# Patient Record
Sex: FEMALE | Race: WHITE | NOT HISPANIC OR LATINO | Employment: OTHER | RURAL
[De-identification: names, ages, dates, MRNs, and addresses within clinical notes are randomized per-mention and may not be internally consistent; named-entity substitution may affect disease eponyms.]

---

## 2017-01-23 ENCOUNTER — TELEPHONE (OUTPATIENT)
Dept: FAMILY MEDICINE CLINIC | Facility: CLINIC | Age: 82
End: 2017-01-23

## 2017-01-23 NOTE — TELEPHONE ENCOUNTER
Pt informed Bystolic does not come generic. Would like something else in generic form if she can because Bystolic is so expensive. Shashank

## 2017-01-24 RX ORDER — CARVEDILOL 25 MG/1
25 TABLET ORAL 2 TIMES DAILY WITH MEALS
Qty: 60 TABLET | Refills: 5 | Status: SHIPPED | OUTPATIENT
Start: 2017-01-24 | End: 2017-03-22 | Stop reason: SDUPTHER

## 2017-02-03 RX ORDER — DILTIAZEM HYDROCHLORIDE 180 MG/1
TABLET, EXTENDED RELEASE ORAL
Qty: 30 TABLET | Refills: 0 | Status: SHIPPED | OUTPATIENT
Start: 2017-02-03 | End: 2017-03-06 | Stop reason: SDUPTHER

## 2017-03-06 RX ORDER — DILTIAZEM HYDROCHLORIDE 180 MG/1
TABLET, EXTENDED RELEASE ORAL
Qty: 30 TABLET | Refills: 0 | Status: SHIPPED | OUTPATIENT
Start: 2017-03-06 | End: 2017-03-22 | Stop reason: SDUPTHER

## 2017-03-06 RX ORDER — LOSARTAN POTASSIUM 100 MG/1
TABLET ORAL
Qty: 90 TABLET | Refills: 0 | Status: SHIPPED | OUTPATIENT
Start: 2017-03-06 | End: 2017-03-22 | Stop reason: SDUPTHER

## 2017-03-15 ENCOUNTER — OFFICE VISIT (OUTPATIENT)
Dept: CARDIOLOGY | Facility: CLINIC | Age: 82
End: 2017-03-15

## 2017-03-15 VITALS
BODY MASS INDEX: 37.82 KG/M2 | SYSTOLIC BLOOD PRESSURE: 130 MMHG | HEIGHT: 65 IN | HEART RATE: 106 BPM | WEIGHT: 227 LBS | DIASTOLIC BLOOD PRESSURE: 64 MMHG

## 2017-03-15 DIAGNOSIS — I48.19 PERSISTENT ATRIAL FIBRILLATION (HCC): ICD-10-CM

## 2017-03-15 DIAGNOSIS — I10 ESSENTIAL HYPERTENSION: ICD-10-CM

## 2017-03-15 DIAGNOSIS — E78.5 HYPERLIPIDEMIA, UNSPECIFIED HYPERLIPIDEMIA TYPE: Primary | ICD-10-CM

## 2017-03-15 PROCEDURE — 99213 OFFICE O/P EST LOW 20 MIN: CPT | Performed by: INTERNAL MEDICINE

## 2017-03-15 NOTE — PROGRESS NOTES
"Subjective    Devora Grant is a 82 y.o. female. Fu of afib    History of Present Illness     AFIB:  Has permanent afib that is well tolertated. No bleeding problems with Xarelto but \"the  adds scare me\". No palpitations or sob or edema    HTN:  Good control by all checks    HLD:  Tolerates her statin ok and gets good reports from pcp on levels.      The following portions of the patient's history were reviewed and updated as appropriate: allergies, current medications, past family history, past medical history, past social history, past surgical history and problem list.    Patient Active Problem List   Diagnosis   • Essential hypertension   • Hyperlipidemia   • Persistent atrial fibrillation   • Malignant neoplasm of upper-outer quadrant of left female breast   • Bilateral shoulder pain       Allergies   Allergen Reactions   • Penicillins Hives   • Sulfa Antibiotics Hives       Family History   Problem Relation Age of Onset   • No Known Problems Mother    • No Known Problems Father        Social History     Social History   • Marital status:      Spouse name: N/A   • Number of children: N/A   • Years of education: N/A     Occupational History   • retired      Social History Main Topics   • Smoking status: Never Smoker   • Smokeless tobacco: Not on file   • Alcohol use No   • Drug use: No   • Sexual activity: No     Other Topics Concern   • Not on file     Social History Narrative         Current Outpatient Prescriptions:   •  Calcium Citrate-Vitamin D (CALCIUM + D PO), Take 500 mg by mouth daily., Disp: , Rfl:   •  carvedilol (COREG) 25 MG tablet, Take 1 tablet by mouth 2 (Two) Times a Day With Meals., Disp: 60 tablet, Rfl: 5  •  losartan (COZAAR) 100 MG tablet, TAKE 1 TABLET BY MOUTH EVERY DAY, Disp: 90 tablet, Rfl: 0  •  LOTEMAX 0.5 % ophthalmic suspension, SHAKE WELL AND INT 1 GTT IN OS D, Disp: , Rfl: 3  •  lovastatin (MEVACOR) 10 MG tablet, TAKE 1 TABLET BY MOUTH DAILY WITH SUPPER, Disp: 90 " "tablet, Rfl: 0  •  MATZIM  MG 24 hr tablet, TAKE 1 TABLET BY MOUTH EVERY DAY, Disp: 30 tablet, Rfl: 0  •  XARELTO 20 MG tablet, TK 1 T PO D, Disp: , Rfl: 10    Past Surgical History   Procedure Laterality Date   • Breast surgery     • Hysterectomy     • Appendectomy     • Mastectomy       Left   • Colonoscopy  10/2015     Polyps, fair prep, recall 1 year   • Colonoscopy       10 yr ago, Dr. Valiente       Review of Systems   Respiratory: Negative for apnea, chest tightness and shortness of breath.    Cardiovascular: Negative for chest pain, palpitations and leg swelling.   Gastrointestinal: Negative for abdominal pain.   Genitourinary: Negative for dysuria.   Musculoskeletal: Negative for myalgias.   Neurological: Negative for weakness and light-headedness.   Psychiatric/Behavioral: Negative for sleep disturbance.       Visit Vitals   • /64 (BP Location: Right arm, Patient Position: Sitting)   • Pulse 106   • Ht 65\" (165.1 cm)   • Wt 227 lb (103 kg)   • BMI 37.77 kg/m2     Procedures    Objective   Physical Exam   Constitutional: She is oriented to person, place, and time.   obese   HENT:   Head: Normocephalic.   Eyes: Pupils are equal, round, and reactive to light.   Cardiovascular: Normal heart sounds.  An irregularly irregular rhythm present. Tachycardia present.  Exam reveals decreased pulses.    Pulmonary/Chest: Effort normal and breath sounds normal. No respiratory distress. She has no wheezes. She has no rales.   Musculoskeletal: She exhibits no edema or tenderness.   Neurological: She is alert and oriented to person, place, and time.   Skin: Skin is warm and dry.   Psychiatric: She has a normal mood and affect.       Assessment/Plan   Devora was seen today for atrial fibrillation, hypertension and hyperlipidemia.    Diagnoses and all orders for this visit:    Hyperlipidemia, unspecified hyperlipidemia type  Comments:  controlled    Persistent atrial fibrillation  Comments:  well " tolerated    Essential hypertension  Comments:  controlled                 Return in about 1 year (around 3/15/2018) for Next scheduled follow up.  No orders of the defined types were placed in this encounter.

## 2017-03-17 ENCOUNTER — LAB (OUTPATIENT)
Dept: FAMILY MEDICINE CLINIC | Facility: CLINIC | Age: 82
End: 2017-03-17

## 2017-03-17 DIAGNOSIS — I10 ESSENTIAL HYPERTENSION: Primary | ICD-10-CM

## 2017-03-17 DIAGNOSIS — E78.5 HYPERLIPIDEMIA, UNSPECIFIED HYPERLIPIDEMIA TYPE: ICD-10-CM

## 2017-03-17 LAB
ALBUMIN SERPL-MCNC: 3.7 G/DL (ref 3.5–5)
ALBUMIN/GLOB SERPL: 1.3 G/DL (ref 1.1–2.5)
ALP SERPL-CCNC: 72 U/L (ref 24–120)
ALT SERPL-CCNC: 32 U/L (ref 0–54)
AST SERPL-CCNC: 23 U/L (ref 7–45)
BASOPHILS # BLD AUTO: 0.04 10*3/MM3 (ref 0–0.2)
BASOPHILS NFR BLD AUTO: 0.8 % (ref 0–2)
BILIRUB SERPL-MCNC: 0.6 MG/DL (ref 0.1–1)
BUN SERPL-MCNC: 12 MG/DL (ref 5–21)
BUN/CREAT SERPL: 17.9 (ref 7–25)
CALCIUM SERPL-MCNC: 9.3 MG/DL (ref 8.4–10.4)
CHLORIDE SERPL-SCNC: 98 MMOL/L (ref 98–110)
CHOLEST SERPL-MCNC: 168 MG/DL (ref 130–200)
CO2 SERPL-SCNC: 34 MMOL/L (ref 24–31)
CREAT SERPL-MCNC: 0.67 MG/DL (ref 0.5–1.4)
EOSINOPHIL # BLD AUTO: 0.16 10*3/MM3 (ref 0–0.7)
EOSINOPHIL NFR BLD AUTO: 3.1 % (ref 0–4)
ERYTHROCYTE [DISTWIDTH] IN BLOOD BY AUTOMATED COUNT: 14.1 % (ref 12–15)
GLOBULIN SER CALC-MCNC: 2.9 GM/DL
GLUCOSE SERPL-MCNC: 95 MG/DL (ref 70–100)
HCT VFR BLD AUTO: 41.8 % (ref 37–47)
HDLC SERPL-MCNC: 64 MG/DL
HGB BLD-MCNC: 13.5 G/DL (ref 12–16)
IMM GRANULOCYTES # BLD: 0.01 10*3/MM3 (ref 0–0.03)
IMM GRANULOCYTES NFR BLD: 0.2 % (ref 0–5)
LDLC SERPL CALC-MCNC: 90 MG/DL (ref 0–99)
LYMPHOCYTES # BLD AUTO: 1.08 10*3/MM3 (ref 0.72–4.86)
LYMPHOCYTES NFR BLD AUTO: 20.8 % (ref 15–45)
MCH RBC QN AUTO: 29.5 PG (ref 28–32)
MCHC RBC AUTO-ENTMCNC: 32.3 G/DL (ref 33–36)
MCV RBC AUTO: 91.3 FL (ref 82–98)
MONOCYTES # BLD AUTO: 0.48 10*3/MM3 (ref 0.19–1.3)
MONOCYTES NFR BLD AUTO: 9.2 % (ref 4–12)
NEUTROPHILS # BLD AUTO: 3.43 10*3/MM3 (ref 1.87–8.4)
NEUTROPHILS NFR BLD AUTO: 65.9 % (ref 39–78)
PLATELET # BLD AUTO: 153 10*3/MM3 (ref 130–400)
POTASSIUM SERPL-SCNC: 4.4 MMOL/L (ref 3.5–5.3)
PROT SERPL-MCNC: 6.6 G/DL (ref 6.3–8.7)
RBC # BLD AUTO: 4.58 10*6/MM3 (ref 4.2–5.4)
SODIUM SERPL-SCNC: 139 MMOL/L (ref 135–145)
TRIGL SERPL-MCNC: 69 MG/DL (ref 0–149)
TSH SERPL DL<=0.005 MIU/L-ACNC: 1.14 MIU/ML (ref 0.47–4.68)
VLDLC SERPL CALC-MCNC: 13.8 MG/DL
WBC # BLD AUTO: 5.2 10*3/MM3 (ref 4.8–10.8)

## 2017-03-22 ENCOUNTER — OFFICE VISIT (OUTPATIENT)
Dept: FAMILY MEDICINE CLINIC | Facility: CLINIC | Age: 82
End: 2017-03-22

## 2017-03-22 VITALS
WEIGHT: 227 LBS | HEART RATE: 143 BPM | OXYGEN SATURATION: 96 % | HEIGHT: 65 IN | SYSTOLIC BLOOD PRESSURE: 140 MMHG | BODY MASS INDEX: 37.82 KG/M2 | DIASTOLIC BLOOD PRESSURE: 82 MMHG | RESPIRATION RATE: 16 BRPM

## 2017-03-22 DIAGNOSIS — E78.2 MIXED HYPERLIPIDEMIA: ICD-10-CM

## 2017-03-22 DIAGNOSIS — M25.511 BILATERAL SHOULDER PAIN, UNSPECIFIED CHRONICITY: ICD-10-CM

## 2017-03-22 DIAGNOSIS — I10 ESSENTIAL HYPERTENSION: Primary | ICD-10-CM

## 2017-03-22 DIAGNOSIS — M25.512 BILATERAL SHOULDER PAIN, UNSPECIFIED CHRONICITY: ICD-10-CM

## 2017-03-22 DIAGNOSIS — I48.19 PERSISTENT ATRIAL FIBRILLATION (HCC): ICD-10-CM

## 2017-03-22 PROCEDURE — 99214 OFFICE O/P EST MOD 30 MIN: CPT | Performed by: FAMILY MEDICINE

## 2017-03-22 RX ORDER — LOVASTATIN 10 MG/1
10 TABLET ORAL DAILY
Qty: 90 TABLET | Refills: 1 | Status: SHIPPED | OUTPATIENT
Start: 2017-03-22 | End: 2017-09-07 | Stop reason: SDUPTHER

## 2017-03-22 RX ORDER — LOSARTAN POTASSIUM 100 MG/1
100 TABLET ORAL DAILY
Qty: 90 TABLET | Refills: 1 | Status: SHIPPED | OUTPATIENT
Start: 2017-03-22 | End: 2017-12-07 | Stop reason: SDUPTHER

## 2017-03-22 RX ORDER — DILTIAZEM HYDROCHLORIDE EXTENDED-RELEASE TABLETS 180 MG/1
180 TABLET, EXTENDED RELEASE ORAL DAILY
Qty: 90 TABLET | Refills: 1 | Status: SHIPPED | OUTPATIENT
Start: 2017-03-22 | End: 2017-09-07 | Stop reason: SDUPTHER

## 2017-03-22 RX ORDER — CARVEDILOL 25 MG/1
25 TABLET ORAL DAILY
Qty: 90 TABLET | Refills: 1 | Status: SHIPPED | OUTPATIENT
Start: 2017-03-22 | End: 2017-09-14 | Stop reason: SDUPTHER

## 2017-03-22 RX ORDER — RIVAROXABAN 20 MG/1
20 TABLET, FILM COATED ORAL
Qty: 90 TABLET | Refills: 1 | Status: SHIPPED | OUTPATIENT
Start: 2017-03-22 | End: 2017-09-07 | Stop reason: SDUPTHER

## 2017-03-22 NOTE — PROGRESS NOTES
Subjective   Devora Grant is a 82 y.o. female.     No chief complaint on file.  CC:im here for check up    History of Present Illness     She nots having good bp control without chest pain or dyspnea...she recently saw dr lopez about afib..she is toelraign xarelto without bleeding...she contineus to have shoulder pain and followed by orthopedics..she is tolerating mevacor without myalgias or muscle weakness       Current Outpatient Prescriptions:   •  Calcium Citrate-Vitamin D (CALCIUM + D PO), Take 500 mg by mouth daily., Disp: , Rfl:   •  carvedilol (COREG) 25 MG tablet, Take 1 tablet by mouth Daily., Disp: 90 tablet, Rfl: 1  •  diltiazem LA (MATZIM LA) 180 MG 24 hr tablet, Take 1 tablet by mouth Daily., Disp: 90 tablet, Rfl: 1  •  losartan (COZAAR) 100 MG tablet, Take 1 tablet by mouth Daily., Disp: 90 tablet, Rfl: 1  •  LOTEMAX 0.5 % ophthalmic suspension, SHAKE WELL AND INT 1 GTT IN OS D, Disp: , Rfl: 3  •  lovastatin (MEVACOR) 10 MG tablet, Take 1 tablet by mouth Daily., Disp: 90 tablet, Rfl: 1  •  XARELTO 20 MG tablet, Take 1 tablet by mouth Daily With Dinner., Disp: 90 tablet, Rfl: 1  Allergies   Allergen Reactions   • Penicillins Hives   • Sulfa Antibiotics Hives       Past Medical History:   Diagnosis Date   • Atrial fibrillation    • Cancer    • Cholelithiasis    • Colon polyp    • Hyperlipidemia    • Hypertension      Past Surgical History:   Procedure Laterality Date   • APPENDECTOMY     • BREAST SURGERY     • COLONOSCOPY  10/2015    Polyps, fair prep, recall 1 year   • COLONOSCOPY      10 yr ago, Dr. Valiente   • HYSTERECTOMY     • MASTECTOMY      Left       Review of Systems   Constitutional: Negative.    HENT: Negative.    Eyes: Negative.    Respiratory: Negative.    Cardiovascular: Negative.    Gastrointestinal: Negative.    Endocrine: Negative.    Genitourinary: Negative.    Musculoskeletal: Positive for arthralgias.   Skin: Negative.    Allergic/Immunologic: Negative.    Neurological: Negative.   "  Hematological: Negative.    Psychiatric/Behavioral: Negative.        Objective  /82  Pulse (!) 143  Resp 16  Ht 65\" (165.1 cm)  Wt 227 lb (103 kg)  SpO2 96%  BMI 37.77 kg/m2  Physical Exam   Constitutional: She is oriented to person, place, and time. She appears well-developed and well-nourished.   HENT:   Head: Normocephalic and atraumatic.   Right Ear: External ear normal.   Left Ear: External ear normal.   Nose: Nose normal.   Mouth/Throat: Oropharynx is clear and moist.   Eyes: Conjunctivae and EOM are normal. Pupils are equal, round, and reactive to light.   Neck: Normal range of motion. Neck supple.   Cardiovascular: Normal rate, normal heart sounds and intact distal pulses.    Pulmonary/Chest: Effort normal and breath sounds normal.   Abdominal: Soft. Bowel sounds are normal.   Musculoskeletal: Normal range of motion.   Neurological: She is alert and oriented to person, place, and time. She has normal reflexes.   Skin: Skin is warm and dry.   Psychiatric: She has a normal mood and affect. Her behavior is normal. Judgment and thought content normal.   Nursing note and vitals reviewed.      Assessment/Plan   Diagnoses and all orders for this visit:    Essential hypertension  -     CBC w AUTO Differential  -     Comprehensive Metabolic Panel  -     Lipid Panel    Mixed hyperlipidemia    Persistent atrial fibrillation    Bilateral shoulder pain, unspecified chronicity                 Orders Placed This Encounter   Procedures   • Comprehensive Metabolic Panel   • Lipid Panel       Follow up: 6 month(s)  "

## 2017-03-23 RX ORDER — LOVASTATIN 10 MG/1
TABLET ORAL
Qty: 90 TABLET | Refills: 1 | Status: SHIPPED | OUTPATIENT
Start: 2017-03-23 | End: 2019-07-30 | Stop reason: SDUPTHER

## 2017-09-07 RX ORDER — RIVAROXABAN 20 MG/1
TABLET, FILM COATED ORAL
Qty: 90 TABLET | Refills: 0 | Status: SHIPPED | OUTPATIENT
Start: 2017-09-07 | End: 2018-03-19

## 2017-09-07 RX ORDER — LOVASTATIN 10 MG/1
TABLET ORAL
Qty: 90 TABLET | Refills: 0 | Status: SHIPPED | OUTPATIENT
Start: 2017-09-07 | End: 2017-09-18 | Stop reason: SDUPTHER

## 2017-09-07 RX ORDER — DILTIAZEM HYDROCHLORIDE 180 MG/1
TABLET, EXTENDED RELEASE ORAL
Qty: 90 TABLET | Refills: 0 | Status: SHIPPED | OUTPATIENT
Start: 2017-09-07 | End: 2018-03-19

## 2017-09-14 RX ORDER — CARVEDILOL 25 MG/1
TABLET ORAL
Qty: 90 TABLET | Refills: 0 | Status: SHIPPED | OUTPATIENT
Start: 2017-09-14 | End: 2019-03-19 | Stop reason: SDUPTHER

## 2017-09-18 ENCOUNTER — OFFICE VISIT (OUTPATIENT)
Dept: FAMILY MEDICINE CLINIC | Facility: CLINIC | Age: 82
End: 2017-09-18

## 2017-09-18 VITALS
HEIGHT: 65 IN | HEART RATE: 114 BPM | OXYGEN SATURATION: 94 % | DIASTOLIC BLOOD PRESSURE: 100 MMHG | RESPIRATION RATE: 18 BRPM | SYSTOLIC BLOOD PRESSURE: 160 MMHG | BODY MASS INDEX: 35.65 KG/M2 | WEIGHT: 214 LBS

## 2017-09-18 DIAGNOSIS — C50.412 MALIGNANT NEOPLASM OF UPPER-OUTER QUADRANT OF LEFT FEMALE BREAST (HCC): ICD-10-CM

## 2017-09-18 DIAGNOSIS — E78.2 MIXED HYPERLIPIDEMIA: ICD-10-CM

## 2017-09-18 DIAGNOSIS — I48.19 PERSISTENT ATRIAL FIBRILLATION (HCC): ICD-10-CM

## 2017-09-18 DIAGNOSIS — I10 ESSENTIAL HYPERTENSION: Primary | ICD-10-CM

## 2017-09-18 LAB
ALBUMIN SERPL-MCNC: 3.8 G/DL (ref 3.5–5)
ALBUMIN/GLOB SERPL: 1.2 G/DL (ref 1.1–2.5)
ALP SERPL-CCNC: 61 U/L (ref 24–120)
ALT SERPL-CCNC: 31 U/L (ref 0–54)
AST SERPL-CCNC: 29 U/L (ref 7–45)
BASOPHILS # BLD AUTO: 0.06 10*3/MM3 (ref 0–0.2)
BASOPHILS NFR BLD AUTO: 0.9 % (ref 0–2)
BILIRUB SERPL-MCNC: 1 MG/DL (ref 0.1–1)
BUN SERPL-MCNC: 17 MG/DL (ref 5–21)
BUN/CREAT SERPL: 18.7 (ref 7–25)
CALCIUM SERPL-MCNC: 8.9 MG/DL (ref 8.4–10.4)
CHLORIDE SERPL-SCNC: 89 MMOL/L (ref 98–110)
CHOLEST SERPL-MCNC: 154 MG/DL (ref 130–200)
CO2 SERPL-SCNC: >40 MMOL/L (ref 24–31)
CREAT SERPL-MCNC: 0.91 MG/DL (ref 0.5–1.4)
EOSINOPHIL # BLD AUTO: 0.21 10*3/MM3 (ref 0–0.7)
EOSINOPHIL NFR BLD AUTO: 3.1 % (ref 0–4)
ERYTHROCYTE [DISTWIDTH] IN BLOOD BY AUTOMATED COUNT: 14.8 % (ref 12–15)
GLOBULIN SER CALC-MCNC: 3.1 GM/DL
GLUCOSE SERPL-MCNC: 94 MG/DL (ref 70–100)
HCT VFR BLD AUTO: 34.8 % (ref 37–47)
HDLC SERPL-MCNC: 55 MG/DL
HGB BLD-MCNC: 11.1 G/DL (ref 12–16)
IMM GRANULOCYTES # BLD: 0.03 10*3/MM3 (ref 0–0.03)
IMM GRANULOCYTES NFR BLD: 0.4 % (ref 0–5)
LDLC SERPL CALC-MCNC: 82 MG/DL (ref 0–99)
LYMPHOCYTES # BLD AUTO: 1.01 10*3/MM3 (ref 0.72–4.86)
LYMPHOCYTES NFR BLD AUTO: 14.8 % (ref 15–45)
MCH RBC QN AUTO: 29.4 PG (ref 28–32)
MCHC RBC AUTO-ENTMCNC: 31.9 G/DL (ref 33–36)
MCV RBC AUTO: 92.1 FL (ref 82–98)
MONOCYTES # BLD AUTO: 0.79 10*3/MM3 (ref 0.19–1.3)
MONOCYTES NFR BLD AUTO: 11.6 % (ref 4–12)
NEUTROPHILS # BLD AUTO: 4.71 10*3/MM3 (ref 1.87–8.4)
NEUTROPHILS NFR BLD AUTO: 69.2 % (ref 39–78)
PLATELET # BLD AUTO: 185 10*3/MM3 (ref 130–400)
POTASSIUM SERPL-SCNC: 2.5 MMOL/L (ref 3.5–5.3)
PROT SERPL-MCNC: 6.9 G/DL (ref 6.3–8.7)
RBC # BLD AUTO: 3.78 10*6/MM3 (ref 4.2–5.4)
SODIUM SERPL-SCNC: 143 MMOL/L (ref 135–145)
TRIGL SERPL-MCNC: 84 MG/DL (ref 0–149)
VLDLC SERPL CALC-MCNC: 16.8 MG/DL
WBC # BLD AUTO: 6.81 10*3/MM3 (ref 4.8–10.8)

## 2017-09-18 PROCEDURE — 99213 OFFICE O/P EST LOW 20 MIN: CPT | Performed by: FAMILY MEDICINE

## 2017-09-18 NOTE — PROGRESS NOTES
Subjective   Devora Grant is a 83 y.o. female.     Chief Complaint   Patient presents with   • Follow-up     6 mo        History of Present Illness     we note her bp is up but she blamesit on note takking it in the am--she denies any palpitaitons--she is tolerating statin wituout myalgias--she is toleating xarelto wituot bleeding  She is no longer seeing oncologist    Current Outpatient Prescriptions:   •  Calcium Citrate-Vitamin D (CALCIUM + D PO), Take 500 mg by mouth daily., Disp: , Rfl:   •  carvedilol (COREG) 25 MG tablet, TAKE 1 TABLET BY MOUTH DAILY, Disp: 90 tablet, Rfl: 0  •  losartan (COZAAR) 100 MG tablet, Take 1 tablet by mouth Daily., Disp: 90 tablet, Rfl: 1  •  LOTEMAX 0.5 % ophthalmic suspension, SHAKE WELL AND INT 1 GTT IN OS D, Disp: , Rfl: 3  •  lovastatin (MEVACOR) 10 MG tablet, TAKE 1 TABLET BY MOUTH DAILY WITH SUPPER, Disp: 90 tablet, Rfl: 1  •  MATZIM  MG 24 hr tablet, TAKE 1 TABLET BY MOUTH DAILY, Disp: 90 tablet, Rfl: 0  •  XARELTO 20 MG tablet, TAKE 1 TABLET BY MOUTH DAILY WITH DINNER, Disp: 90 tablet, Rfl: 0  Allergies   Allergen Reactions   • Penicillins Hives   • Sulfa Antibiotics Hives       Past Medical History:   Diagnosis Date   • Atrial fibrillation    • Cancer    • Cholelithiasis    • Colon polyp    • Hyperlipidemia    • Hypertension      Past Surgical History:   Procedure Laterality Date   • APPENDECTOMY     • BREAST SURGERY     • COLONOSCOPY  10/2015    Polyps, fair prep, recall 1 year   • COLONOSCOPY      10 yr ago, Dr. Valiente   • HYSTERECTOMY     • MASTECTOMY      Left       Review of Systems   Constitutional: Negative.    HENT: Negative.    Eyes: Negative.    Respiratory: Negative.    Cardiovascular: Negative.    Gastrointestinal: Negative.    Endocrine: Negative.    Genitourinary: Negative.    Musculoskeletal: Negative.    Skin: Negative.    Allergic/Immunologic: Negative.    Neurological: Negative.    Hematological: Negative.    Psychiatric/Behavioral: Negative.   "      Objective  /100  Pulse 114  Resp 18  Ht 65\" (165.1 cm)  Wt 214 lb (97.1 kg)  SpO2 94%  BMI 35.61 kg/m2  Physical Exam   Constitutional: She is oriented to person, place, and time. She appears well-developed and well-nourished.   HENT:   Head: Normocephalic and atraumatic.   Right Ear: External ear normal.   Left Ear: External ear normal.   Nose: Nose normal.   Mouth/Throat: Oropharynx is clear and moist.   Eyes: Conjunctivae and EOM are normal. Pupils are equal, round, and reactive to light.   Neck: Normal range of motion. Neck supple.   Cardiovascular: Normal rate, normal heart sounds and intact distal pulses.    Pulmonary/Chest: Effort normal and breath sounds normal.   Abdominal: Soft. Bowel sounds are normal.   Musculoskeletal: Normal range of motion.   Neurological: She is alert and oriented to person, place, and time. She has normal reflexes.   Skin: Skin is warm and dry.   Psychiatric: She has a normal mood and affect. Her behavior is normal. Judgment and thought content normal.   Nursing note and vitals reviewed.      Assessment/Plan   Devora was seen today for follow-up.    Diagnoses and all orders for this visit:    Essential hypertension  -     CBC w AUTO Differential  -     Comprehensive Metabolic Panel    Mixed hyperlipidemia  -     Lipid Panel    Persistent atrial fibrillation    Malignant neoplasm of upper-outer quadrant of left female breast      She does not get mammograms --she declines this    She declines further colonoscopies       Orders Placed This Encounter   Procedures   • Comprehensive Metabolic Panel   • Lipid Panel   • CBC w AUTO Differential     Order Specific Question:   Manual Differential     Answer:   No       Follow up: 6 month(s)  "

## 2017-09-19 ENCOUNTER — LAB (OUTPATIENT)
Dept: FAMILY MEDICINE CLINIC | Facility: CLINIC | Age: 82
End: 2017-09-19

## 2017-09-19 DIAGNOSIS — E87.6 LOW SERUM POTASSIUM LEVEL: Primary | ICD-10-CM

## 2017-09-20 ENCOUNTER — TELEPHONE (OUTPATIENT)
Dept: FAMILY MEDICINE CLINIC | Facility: CLINIC | Age: 82
End: 2017-09-20

## 2017-09-20 DIAGNOSIS — E87.6 LOW SERUM POTASSIUM: Primary | ICD-10-CM

## 2017-09-20 LAB — POTASSIUM SERPL-SCNC: 2.3 MMOL/L (ref 3.5–5.3)

## 2017-09-20 RX ORDER — POTASSIUM CHLORIDE 20 MEQ/1
20 TABLET, EXTENDED RELEASE ORAL DAILY
Qty: 2 TABLET | Refills: 0 | Status: SHIPPED | OUTPATIENT
Start: 2017-09-20 | End: 2020-04-24

## 2017-09-20 RX ORDER — SPIRONOLACTONE 25 MG/1
25 TABLET ORAL DAILY
Qty: 30 TABLET | Refills: 5 | Status: SHIPPED | OUTPATIENT
Start: 2017-09-20 | End: 2017-10-06 | Stop reason: CLARIF

## 2017-09-20 NOTE — TELEPHONE ENCOUNTER
Ok rx all done pt is aware lab appt is made her bp today is 150/80 pulse 76 she will be in fri am for lab

## 2017-09-20 NOTE — TELEPHONE ENCOUNTER
tod--had sent these abnormal labs to [facility] but forgot she is not working today and it needs attention today--so--let bhanu know her potassium is very low and we need to get it up--so--start aldactone 25mg #30,5rf and start K-dur 20meq daily #2 --2 days only--then come in early and get potassium rechecked--I want to make sure its better before the weekend

## 2017-09-22 LAB — POTASSIUM SERPL-SCNC: 2.8 MMOL/L (ref 3.5–5.3)

## 2017-09-22 RX ORDER — POTASSIUM CHLORIDE 600 MG/1
8 TABLET, FILM COATED, EXTENDED RELEASE ORAL ONCE
Qty: 1 TABLET | Refills: 0 | Status: SHIPPED | OUTPATIENT
Start: 2017-09-22 | End: 2017-09-22

## 2017-09-25 PROCEDURE — 99307 SBSQ NF CARE SF MDM 10: CPT | Performed by: FAMILY MEDICINE

## 2017-09-26 PROCEDURE — 99307 SBSQ NF CARE SF MDM 10: CPT | Performed by: FAMILY MEDICINE

## 2017-09-27 ENCOUNTER — TELEPHONE (OUTPATIENT)
Dept: FAMILY MEDICINE CLINIC | Facility: CLINIC | Age: 82
End: 2017-09-27

## 2017-09-27 NOTE — TELEPHONE ENCOUNTER
SPOKE WITH TRACY @ [facility].  I HAD TALKED TO ANEL EARLIER.  NAKITA IS A CANDIDATE FOR SWING BED AND THAT IS WHAT SHE WOULD LIKE TO DO IF YOU ARE OK WITH THAT.  SERGIO

## 2017-09-27 NOTE — TELEPHONE ENCOUNTER
Physical Therapy called from [place].   Devora said you had mentioned her going to the nursing home for awhile.  She said she has bad memories of the nursing home due to Ernst & Carla both dying out there.  She wants you to know that she is really working hard to get her strength back.  She walked 130 ft yesterday, twice what she walked the day before.  PT wonders if they can prepare her to go to swing bed for a couple weeks and then maybe she can go home with home health & pt.  Just an FYI.

## 2017-09-27 NOTE — TELEPHONE ENCOUNTER
Call toby and find out if what they decided about devora?--is she a candidate for swing?--what does Devora want to do?

## 2017-09-27 NOTE — TELEPHONE ENCOUNTER
She is going to admit todaty she will send over papter to sign and fax back.do u still want her on trandate prn she has not had since yesterday [id number]

## 2017-09-28 ENCOUNTER — TELEPHONE (OUTPATIENT)
Dept: FAMILY MEDICINE CLINIC | Facility: CLINIC | Age: 82
End: 2017-09-28

## 2017-09-30 PROCEDURE — 99307 SBSQ NF CARE SF MDM 10: CPT | Performed by: FAMILY MEDICINE

## 2017-10-03 PROCEDURE — 99307 SBSQ NF CARE SF MDM 10: CPT | Performed by: FAMILY MEDICINE

## 2017-10-05 ENCOUNTER — TELEPHONE (OUTPATIENT)
Dept: FAMILY MEDICINE CLINIC | Facility: CLINIC | Age: 82
End: 2017-10-05

## 2017-10-05 NOTE — TELEPHONE ENCOUNTER
Michelle Aldana called and said that OT and PT had a meeting today w/ Devora and are ready to discharge her tomorrow as long as you are ready to discharge her.   She said that Devora will need home health w/ PT/OT, nursing service and a rolling walker ordered.

## 2017-10-06 ENCOUNTER — TELEPHONE (OUTPATIENT)
Dept: FAMILY MEDICINE CLINIC | Facility: CLINIC | Age: 82
End: 2017-10-06

## 2017-10-06 ENCOUNTER — OUTSIDE FACILITY SERVICE (OUTPATIENT)
Dept: FAMILY MEDICINE CLINIC | Facility: CLINIC | Age: 82
End: 2017-10-06

## 2017-10-06 RX ORDER — SPIRONOLACTONE 50 MG/1
50 TABLET, FILM COATED ORAL DAILY
Qty: 30 TABLET | Refills: 6 | Status: SHIPPED | OUTPATIENT
Start: 2017-10-06 | End: 2018-01-09 | Stop reason: SDUPTHER

## 2017-10-06 RX ORDER — HYDRALAZINE HYDROCHLORIDE 25 MG/1
25 TABLET, FILM COATED ORAL 3 TIMES DAILY
Qty: 90 TABLET | Refills: 5 | Status: SHIPPED | OUTPATIENT
Start: 2017-10-06 | End: 2019-03-19 | Stop reason: SDUPTHER

## 2017-10-06 RX ORDER — HYDRALAZINE HYDROCHLORIDE 25 MG/1
25 TABLET, FILM COATED ORAL 3 TIMES DAILY
Qty: 90 TABLET | Refills: 5 | Status: SHIPPED | OUTPATIENT
Start: 2017-10-06 | End: 2018-03-26 | Stop reason: SDUPTHER

## 2017-10-06 NOTE — TELEPHONE ENCOUNTER
Pt is being discharged from the hospital. Needs scripts for Aldactone 50mg 1 qd  and Hydralazine 25mg tid to her pharmacy [facility]. Has been done

## 2017-10-12 ENCOUNTER — OFFICE VISIT (OUTPATIENT)
Dept: FAMILY MEDICINE CLINIC | Facility: CLINIC | Age: 82
End: 2017-10-12

## 2017-10-12 VITALS
DIASTOLIC BLOOD PRESSURE: 80 MMHG | WEIGHT: 206 LBS | HEART RATE: 89 BPM | HEIGHT: 65 IN | RESPIRATION RATE: 16 BRPM | OXYGEN SATURATION: 94 % | SYSTOLIC BLOOD PRESSURE: 140 MMHG | BODY MASS INDEX: 34.32 KG/M2

## 2017-10-12 DIAGNOSIS — E87.6 HYPOKALEMIA: ICD-10-CM

## 2017-10-12 DIAGNOSIS — I10 ESSENTIAL HYPERTENSION: Primary | ICD-10-CM

## 2017-10-12 LAB
BASOPHILS # BLD AUTO: 0.07 10*3/MM3 (ref 0–0.2)
BASOPHILS NFR BLD AUTO: 1 % (ref 0–2)
BUN SERPL-MCNC: 22 MG/DL (ref 5–21)
BUN/CREAT SERPL: 18.2 (ref 7–25)
CALCIUM SERPL-MCNC: 10.3 MG/DL (ref 8.4–10.4)
CHLORIDE SERPL-SCNC: 97 MMOL/L (ref 98–110)
CO2 SERPL-SCNC: 30 MMOL/L (ref 24–31)
CREAT SERPL-MCNC: 1.21 MG/DL (ref 0.5–1.4)
EOSINOPHIL # BLD AUTO: 0.47 10*3/MM3 (ref 0–0.7)
EOSINOPHIL NFR BLD AUTO: 6.6 % (ref 0–4)
ERYTHROCYTE [DISTWIDTH] IN BLOOD BY AUTOMATED COUNT: 16.2 % (ref 12–15)
GLUCOSE SERPL-MCNC: 112 MG/DL (ref 70–100)
HCT VFR BLD AUTO: 36.3 % (ref 37–47)
HGB BLD-MCNC: 11.3 G/DL (ref 12–16)
IMM GRANULOCYTES # BLD: 0.02 10*3/MM3 (ref 0–0.03)
IMM GRANULOCYTES NFR BLD: 0.3 % (ref 0–5)
LYMPHOCYTES # BLD AUTO: 0.8 10*3/MM3 (ref 0.72–4.86)
LYMPHOCYTES NFR BLD AUTO: 11.3 % (ref 15–45)
MCH RBC QN AUTO: 29.9 PG (ref 28–32)
MCHC RBC AUTO-ENTMCNC: 31.1 G/DL (ref 33–36)
MCV RBC AUTO: 96 FL (ref 82–98)
MONOCYTES # BLD AUTO: 0.58 10*3/MM3 (ref 0.19–1.3)
MONOCYTES NFR BLD AUTO: 8.2 % (ref 4–12)
NEUTROPHILS # BLD AUTO: 5.14 10*3/MM3 (ref 1.87–8.4)
NEUTROPHILS NFR BLD AUTO: 72.6 % (ref 39–78)
PLATELET # BLD AUTO: 207 10*3/MM3 (ref 130–400)
POTASSIUM SERPL-SCNC: 4.4 MMOL/L (ref 3.5–5.3)
RBC # BLD AUTO: 3.78 10*6/MM3 (ref 4.2–5.4)
SODIUM SERPL-SCNC: 140 MMOL/L (ref 135–145)
WBC # BLD AUTO: 7.08 10*3/MM3 (ref 4.8–10.8)

## 2017-10-12 PROCEDURE — 99213 OFFICE O/P EST LOW 20 MIN: CPT | Performed by: FAMILY MEDICINE

## 2017-10-12 NOTE — PROGRESS NOTES
Subjective   Devora Grant is a 83 y.o. female.     Chief Complaint   Patient presents with   • Follow-up     hospital        History of Present Illness     recently hospitalzied for bp and hypokalemia--her muscle strength is better--sheis walking better      Current Outpatient Prescriptions:   •  Calcium Citrate-Vitamin D (CALCIUM + D PO), Take 500 mg by mouth daily., Disp: , Rfl:   •  carvedilol (COREG) 25 MG tablet, TAKE 1 TABLET BY MOUTH DAILY, Disp: 90 tablet, Rfl: 0  •  hydrALAZINE (APRESOLINE) 25 MG tablet, Take 1 tablet by mouth 3 (Three) Times a Day., Disp: 90 tablet, Rfl: 5  •  hydrALAZINE (APRESOLINE) 25 MG tablet, Take 1 tablet by mouth 3 (Three) Times a Day., Disp: 90 tablet, Rfl: 5  •  losartan (COZAAR) 100 MG tablet, Take 1 tablet by mouth Daily., Disp: 90 tablet, Rfl: 1  •  LOTEMAX 0.5 % ophthalmic suspension, SHAKE WELL AND INT 1 GTT IN OS D, Disp: , Rfl: 3  •  lovastatin (MEVACOR) 10 MG tablet, TAKE 1 TABLET BY MOUTH DAILY WITH SUPPER, Disp: 90 tablet, Rfl: 1  •  MATZIM  MG 24 hr tablet, TAKE 1 TABLET BY MOUTH DAILY, Disp: 90 tablet, Rfl: 0  •  potassium chloride (K-DUR,KLOR-CON) 20 MEQ CR tablet, Take 1 tablet by mouth Daily., Disp: 2 tablet, Rfl: 0  •  spironolactone (ALDACTONE) 50 MG tablet, Take 1 tablet by mouth Daily., Disp: 30 tablet, Rfl: 6  •  XARELTO 20 MG tablet, TAKE 1 TABLET BY MOUTH DAILY WITH DINNER, Disp: 90 tablet, Rfl: 0  Allergies   Allergen Reactions   • Penicillins Hives   • Sulfa Antibiotics Hives       Past Medical History:   Diagnosis Date   • Atrial fibrillation    • Cancer    • Cholelithiasis    • Colon polyp    • Hyperlipidemia    • Hypertension      Past Surgical History:   Procedure Laterality Date   • APPENDECTOMY     • BREAST SURGERY     • COLONOSCOPY  10/2015    Polyps, fair prep, recall 1 year   • COLONOSCOPY      10 yr ago, Dr. Valiente   • HYSTERECTOMY     • MASTECTOMY      Left       Review of Systems   Constitutional: Negative.    HENT: Negative.    Eyes:  "Negative.    Respiratory: Negative.    Cardiovascular: Negative.    Gastrointestinal: Negative.    Endocrine: Negative.    Genitourinary: Negative.    Musculoskeletal: Positive for myalgias.   Skin: Negative.    Allergic/Immunologic: Negative.    Neurological: Negative.    Hematological: Negative.    Psychiatric/Behavioral: Negative.        Objective  /80  Pulse 89  Resp 16  Ht 65\" (165.1 cm)  Wt 206 lb (93.4 kg)  SpO2 94%  BMI 34.28 kg/m2  Physical Exam   Constitutional: She is oriented to person, place, and time. She appears well-developed and well-nourished.   HENT:   Head: Normocephalic.   Eyes: EOM are normal. Pupils are equal, round, and reactive to light.   Neck: Normal range of motion.   Cardiovascular: Normal rate, regular rhythm, normal heart sounds and intact distal pulses.    Pulmonary/Chest: Effort normal and breath sounds normal.   Abdominal: Soft. Bowel sounds are normal.   Musculoskeletal: Normal range of motion.   Neurological: She is alert and oriented to person, place, and time. She has normal reflexes.   Skin: Skin is warm and dry.   Psychiatric: She has a normal mood and affect. Her behavior is normal. Judgment and thought content normal.   Nursing note and vitals reviewed.      Assessment/Plan   Devora was seen today for follow-up.    Diagnoses and all orders for this visit:    Essential hypertension  -     CBC w AUTO Differential  -     Basic metabolic panel    Hypokalemia  -     CBC w AUTO Differential  -     Basic metabolic panel                 Orders Placed This Encounter   Procedures   • Basic metabolic panel   • CBC w AUTO Differential     Order Specific Question:   Manual Differential     Answer:   No     Current outpatient and discharge medications have been reconciled for the patient.  Sandoval Christian MD  Follow up: 3 month(s)  "

## 2017-11-20 RX ORDER — DILTIAZEM HYDROCHLORIDE 180 MG/1
180 CAPSULE, EXTENDED RELEASE ORAL DAILY
Qty: 30 CAPSULE | Refills: 5 | OUTPATIENT
Start: 2017-11-20 | End: 2018-01-09 | Stop reason: SDUPTHER

## 2017-11-20 NOTE — TELEPHONE ENCOUNTER
Matzim LA is not covered by Patients insurance.  Dr Christian has changed med to Dilt- mg capsules.

## 2017-12-07 RX ORDER — LOSARTAN POTASSIUM 100 MG/1
TABLET ORAL
Qty: 90 TABLET | Refills: 0 | Status: SHIPPED | OUTPATIENT
Start: 2017-12-07 | End: 2018-02-27 | Stop reason: SDUPTHER

## 2017-12-27 RX ORDER — LOVASTATIN 10 MG/1
TABLET ORAL
Qty: 90 TABLET | Refills: 0 | Status: SHIPPED | OUTPATIENT
Start: 2017-12-27 | End: 2018-03-19 | Stop reason: SDUPTHER

## 2018-01-09 RX ORDER — DILTIAZEM HYDROCHLORIDE 180 MG/1
180 CAPSULE, EXTENDED RELEASE ORAL DAILY
Qty: 90 CAPSULE | Refills: 1 | OUTPATIENT
Start: 2018-01-09 | End: 2018-09-18 | Stop reason: SDUPTHER

## 2018-01-09 RX ORDER — SPIRONOLACTONE 50 MG/1
50 TABLET, FILM COATED ORAL DAILY
Qty: 90 TABLET | Refills: 1 | Status: SHIPPED | OUTPATIENT
Start: 2018-01-09 | End: 2018-08-06 | Stop reason: SDUPTHER

## 2018-02-27 RX ORDER — LOSARTAN POTASSIUM 100 MG/1
TABLET ORAL
Qty: 90 TABLET | Refills: 0 | Status: SHIPPED | OUTPATIENT
Start: 2018-02-27 | End: 2018-05-29 | Stop reason: SDUPTHER

## 2018-03-19 ENCOUNTER — OFFICE VISIT (OUTPATIENT)
Dept: FAMILY MEDICINE CLINIC | Facility: CLINIC | Age: 83
End: 2018-03-19

## 2018-03-19 VITALS
SYSTOLIC BLOOD PRESSURE: 128 MMHG | RESPIRATION RATE: 16 BRPM | BODY MASS INDEX: 32.65 KG/M2 | WEIGHT: 196 LBS | OXYGEN SATURATION: 96 % | DIASTOLIC BLOOD PRESSURE: 80 MMHG | HEART RATE: 85 BPM | HEIGHT: 65 IN

## 2018-03-19 DIAGNOSIS — E78.2 MIXED HYPERLIPIDEMIA: ICD-10-CM

## 2018-03-19 DIAGNOSIS — E87.6 HYPOKALEMIA: ICD-10-CM

## 2018-03-19 DIAGNOSIS — I48.19 PERSISTENT ATRIAL FIBRILLATION (HCC): Primary | ICD-10-CM

## 2018-03-19 DIAGNOSIS — I10 ESSENTIAL HYPERTENSION: ICD-10-CM

## 2018-03-19 PROCEDURE — 99213 OFFICE O/P EST LOW 20 MIN: CPT | Performed by: FAMILY MEDICINE

## 2018-03-19 NOTE — PROGRESS NOTES
Subjective   Devora Grant is a 83 y.o. female.     Chief Complaint   Patient presents with   • Follow-up     6 mo        History of Present Illness     she thinks she is doing well wtih atrial fib without cp or ha---she notes bp is stable wituout cp or ha-she is noting tolerating potassium nicely--she is tolerating mevacor without myalgias      Current Outpatient Prescriptions:   •  apixaban (ELIQUIS) 5 MG tablet tablet, Take 1 tablet by mouth Every 12 (Twelve) Hours., Disp: 180 tablet, Rfl: 1  •  Calcium Citrate-Vitamin D (CALCIUM + D PO), Take 500 mg by mouth daily., Disp: , Rfl:   •  carvedilol (COREG) 25 MG tablet, TAKE 1 TABLET BY MOUTH DAILY, Disp: 90 tablet, Rfl: 0  •  diltiazem XR (DILACOR XR) 180 MG 24 hr capsule, Take 1 capsule by mouth Daily., Disp: 90 capsule, Rfl: 1  •  hydrALAZINE (APRESOLINE) 25 MG tablet, Take 1 tablet by mouth 3 (Three) Times a Day., Disp: 90 tablet, Rfl: 5  •  hydrALAZINE (APRESOLINE) 25 MG tablet, Take 1 tablet by mouth 3 (Three) Times a Day., Disp: 90 tablet, Rfl: 5  •  losartan (COZAAR) 100 MG tablet, TAKE 1 TABLET BY MOUTH DAILY, Disp: 90 tablet, Rfl: 0  •  LOTEMAX 0.5 % ophthalmic suspension, SHAKE WELL AND INT 1 GTT IN OS D, Disp: , Rfl: 3  •  lovastatin (MEVACOR) 10 MG tablet, TAKE 1 TABLET BY MOUTH DAILY WITH SUPPER, Disp: 90 tablet, Rfl: 1  •  potassium chloride (K-DUR,KLOR-CON) 20 MEQ CR tablet, Take 1 tablet by mouth Daily., Disp: 2 tablet, Rfl: 0  •  spironolactone (ALDACTONE) 50 MG tablet, Take 1 tablet by mouth Daily., Disp: 90 tablet, Rfl: 1  Allergies   Allergen Reactions   • Penicillins Hives   • Sulfa Antibiotics Hives       Past Medical History:   Diagnosis Date   • Atrial fibrillation    • Cancer    • Cholelithiasis    • Colon polyp    • Hyperlipidemia    • Hypertension      Past Surgical History:   Procedure Laterality Date   • APPENDECTOMY     • BREAST SURGERY     • COLONOSCOPY  10/2015    Polyps, fair prep, recall 1 year   • COLONOSCOPY      10 yr ago,   "Steffanie   • HYSTERECTOMY     • MASTECTOMY      Left       Review of Systems   Constitutional: Negative.    HENT: Negative.    Eyes: Negative.    Respiratory: Negative.    Cardiovascular: Negative.    Gastrointestinal: Negative.    Endocrine: Negative.    Genitourinary: Negative.    Musculoskeletal: Negative.    Skin: Negative.    Allergic/Immunologic: Negative.    Neurological: Negative.    Hematological: Negative.    Psychiatric/Behavioral: Negative.        Objective  /80   Pulse 85   Resp 16   Ht 165.1 cm (65\")   Wt 88.9 kg (196 lb)   SpO2 96%   BMI 32.62 kg/m²   Physical Exam   Constitutional: She is oriented to person, place, and time. She appears well-developed and well-nourished.   HENT:   Head: Normocephalic and atraumatic.   Right Ear: External ear normal.   Left Ear: External ear normal.   Nose: Nose normal.   Mouth/Throat: Oropharynx is clear and moist.   Eyes: Conjunctivae and EOM are normal. Pupils are equal, round, and reactive to light.   Neck: Normal range of motion. Neck supple.   Cardiovascular: Normal rate, regular rhythm, normal heart sounds and intact distal pulses.    Pulmonary/Chest: Effort normal and breath sounds normal.   Abdominal: Soft. Bowel sounds are normal.   Musculoskeletal: Normal range of motion.   Neurological: She is alert and oriented to person, place, and time. She has normal reflexes.   Skin: Skin is warm and dry. Capillary refill takes less than 2 seconds.   Psychiatric: She has a normal mood and affect. Her behavior is normal. Judgment and thought content normal.   Nursing note and vitals reviewed.      Assessment/Plan   Devora was seen today for follow-up.    Diagnoses and all orders for this visit:    Persistent atrial fibrillation    Mixed hyperlipidemia    Essential hypertension    Hypokalemia    we reviwed her labs  She declines further mammograms and colon ca screening    Discussed the patient's BMI with her. BMI is above normal parameters. Follow-up plan " includes:  no follow-up required.         No orders of the defined types were placed in this encounter.      Follow up: 6 month(s)

## 2018-03-27 RX ORDER — LOVASTATIN 10 MG/1
TABLET ORAL
Qty: 90 TABLET | Refills: 5 | Status: SHIPPED | OUTPATIENT
Start: 2018-03-27 | End: 2018-09-18 | Stop reason: SDUPTHER

## 2018-03-27 RX ORDER — HYDRALAZINE HYDROCHLORIDE 25 MG/1
TABLET, FILM COATED ORAL
Qty: 90 TABLET | Refills: 5 | Status: SHIPPED | OUTPATIENT
Start: 2018-03-27 | End: 2018-09-18 | Stop reason: SDUPTHER

## 2018-03-27 RX ORDER — CARVEDILOL 25 MG/1
TABLET ORAL
Qty: 60 TABLET | Refills: 5 | Status: SHIPPED | OUTPATIENT
Start: 2018-03-27 | End: 2018-09-18 | Stop reason: DRUGHIGH

## 2018-05-29 RX ORDER — LOSARTAN POTASSIUM 100 MG/1
TABLET ORAL
Qty: 90 TABLET | Refills: 1 | Status: SHIPPED | OUTPATIENT
Start: 2018-05-29 | End: 2018-11-27 | Stop reason: SDUPTHER

## 2018-07-03 RX ORDER — DILTIAZEM HYDROCHLORIDE 180 MG/1
CAPSULE, EXTENDED RELEASE ORAL
Qty: 90 CAPSULE | Refills: 0 | Status: SHIPPED | OUTPATIENT
Start: 2018-07-03 | End: 2018-09-27 | Stop reason: SDUPTHER

## 2018-07-19 RX ORDER — APIXABAN 5 MG/1
TABLET, FILM COATED ORAL
Qty: 180 TABLET | Refills: 1 | Status: SHIPPED | OUTPATIENT
Start: 2018-07-19 | End: 2018-12-28 | Stop reason: SDUPTHER

## 2018-08-06 RX ORDER — SPIRONOLACTONE 50 MG/1
50 TABLET, FILM COATED ORAL DAILY
Qty: 90 TABLET | Refills: 3 | Status: SHIPPED | OUTPATIENT
Start: 2018-08-06 | End: 2018-11-14 | Stop reason: SDUPTHER

## 2018-09-13 DIAGNOSIS — I10 ESSENTIAL HYPERTENSION: ICD-10-CM

## 2018-09-13 DIAGNOSIS — E78.5 HYPERLIPIDEMIA, UNSPECIFIED HYPERLIPIDEMIA TYPE: Primary | ICD-10-CM

## 2018-09-13 DIAGNOSIS — E78.5 HYPERLIPIDEMIA, UNSPECIFIED HYPERLIPIDEMIA TYPE: ICD-10-CM

## 2018-09-14 LAB
ALBUMIN SERPL-MCNC: 3.7 G/DL (ref 3.5–5)
ALBUMIN/GLOB SERPL: 1.3 G/DL (ref 1.1–2.5)
ALP SERPL-CCNC: 53 U/L (ref 24–120)
ALT SERPL-CCNC: 29 U/L (ref 0–54)
AST SERPL-CCNC: 24 U/L (ref 7–45)
BASOPHILS # BLD AUTO: 0.06 10*3/MM3 (ref 0–0.2)
BASOPHILS NFR BLD AUTO: 1.2 % (ref 0–2)
BILIRUB SERPL-MCNC: 0.5 MG/DL (ref 0.1–1)
BUN SERPL-MCNC: 20 MG/DL (ref 5–21)
BUN/CREAT SERPL: 24.4 (ref 7–25)
CALCIUM SERPL-MCNC: 9.7 MG/DL (ref 8.4–10.4)
CHLORIDE SERPL-SCNC: 98 MMOL/L (ref 98–110)
CHOLEST SERPL-MCNC: 160 MG/DL (ref 130–200)
CO2 SERPL-SCNC: 31 MMOL/L (ref 24–31)
CREAT SERPL-MCNC: 0.82 MG/DL (ref 0.5–1.4)
EOSINOPHIL # BLD AUTO: 0.21 10*3/MM3 (ref 0–0.7)
EOSINOPHIL NFR BLD AUTO: 4.3 % (ref 0–4)
ERYTHROCYTE [DISTWIDTH] IN BLOOD BY AUTOMATED COUNT: 13.4 % (ref 12–15)
GLOBULIN SER CALC-MCNC: 2.9 GM/DL
GLUCOSE SERPL-MCNC: 91 MG/DL (ref 70–100)
HCT VFR BLD AUTO: 34.2 % (ref 37–47)
HDLC SERPL-MCNC: 54 MG/DL
HGB BLD-MCNC: 11 G/DL (ref 12–16)
IMM GRANULOCYTES # BLD: 0.01 10*3/MM3 (ref 0–0.03)
IMM GRANULOCYTES NFR BLD: 0.2 % (ref 0–5)
LDLC SERPL CALC-MCNC: 92 MG/DL (ref 0–99)
LYMPHOCYTES # BLD AUTO: 0.99 10*3/MM3 (ref 0.72–4.86)
LYMPHOCYTES NFR BLD AUTO: 20.2 % (ref 15–45)
MCH RBC QN AUTO: 31 PG (ref 28–32)
MCHC RBC AUTO-ENTMCNC: 32.2 G/DL (ref 33–36)
MCV RBC AUTO: 96.3 FL (ref 82–98)
MONOCYTES # BLD AUTO: 0.57 10*3/MM3 (ref 0.19–1.3)
MONOCYTES NFR BLD AUTO: 11.6 % (ref 4–12)
NEUTROPHILS # BLD AUTO: 3.06 10*3/MM3 (ref 1.87–8.4)
NEUTROPHILS NFR BLD AUTO: 62.5 % (ref 39–78)
NRBC BLD AUTO-RTO: 0 /100 WBC (ref 0–0)
PLATELET # BLD AUTO: 141 10*3/MM3 (ref 130–400)
POTASSIUM SERPL-SCNC: 4.3 MMOL/L (ref 3.5–5.3)
PROT SERPL-MCNC: 6.6 G/DL (ref 6.3–8.7)
RBC # BLD AUTO: 3.55 10*6/MM3 (ref 4.2–5.4)
SODIUM SERPL-SCNC: 139 MMOL/L (ref 135–145)
TRIGL SERPL-MCNC: 71 MG/DL (ref 0–149)
TSH SERPL DL<=0.005 MIU/L-ACNC: 1.67 MIU/ML (ref 0.47–4.68)
VLDLC SERPL CALC-MCNC: 14.2 MG/DL
WBC # BLD AUTO: 4.9 10*3/MM3 (ref 4.8–10.8)

## 2018-09-18 ENCOUNTER — OFFICE VISIT (OUTPATIENT)
Dept: FAMILY MEDICINE CLINIC | Facility: CLINIC | Age: 83
End: 2018-09-18

## 2018-09-18 VITALS
HEIGHT: 65 IN | TEMPERATURE: 98.2 F | RESPIRATION RATE: 18 BRPM | SYSTOLIC BLOOD PRESSURE: 132 MMHG | OXYGEN SATURATION: 96 % | DIASTOLIC BLOOD PRESSURE: 76 MMHG | HEART RATE: 90 BPM | WEIGHT: 185 LBS | BODY MASS INDEX: 30.82 KG/M2

## 2018-09-18 DIAGNOSIS — I10 ESSENTIAL HYPERTENSION: Primary | ICD-10-CM

## 2018-09-18 DIAGNOSIS — C50.412 MALIGNANT NEOPLASM OF UPPER-OUTER QUADRANT OF LEFT FEMALE BREAST, UNSPECIFIED ESTROGEN RECEPTOR STATUS (HCC): ICD-10-CM

## 2018-09-18 DIAGNOSIS — E78.2 MIXED HYPERLIPIDEMIA: ICD-10-CM

## 2018-09-18 DIAGNOSIS — I48.19 PERSISTENT ATRIAL FIBRILLATION (HCC): ICD-10-CM

## 2018-09-18 PROCEDURE — 99214 OFFICE O/P EST MOD 30 MIN: CPT | Performed by: FAMILY MEDICINE

## 2018-09-18 NOTE — PROGRESS NOTES
Subjective   Devora Grant is a 84 y.o. female.     Chief Complaint   Patient presents with   • Hypertension   • Hyperlipidemia        History of Present Illness     she nots good bp control without ha or cp--she is still anticoagullated wtih warfarin for afib--toleraging statin tx withtout myalgia s...her breast ca stattus is status      Current Outpatient Prescriptions:   •  Calcium Citrate-Vitamin D (CALCIUM + D PO), Take 500 mg by mouth daily., Disp: , Rfl:   •  carvedilol (COREG) 25 MG tablet, TAKE 1 TABLET BY MOUTH DAILY, Disp: 90 tablet, Rfl: 0  •  diltiaZEM (TIAZAC) 180 MG 24 hr capsule, TAKE 1 CAPSULE BY MOUTH EVERY DAY, Disp: 90 capsule, Rfl: 0  •  ELIQUIS 5 MG tablet tablet, TAKE 1 TABLET BY MOUTH EVERY 12 HOURS, Disp: 180 tablet, Rfl: 1  •  hydrALAZINE (APRESOLINE) 25 MG tablet, Take 1 tablet by mouth 3 (Three) Times a Day., Disp: 90 tablet, Rfl: 5  •  losartan (COZAAR) 100 MG tablet, TAKE 1 TABLET BY MOUTH DAILY, Disp: 90 tablet, Rfl: 1  •  LOTEMAX 0.5 % ophthalmic suspension, SHAKE WELL AND INT 1 GTT IN OS D, Disp: , Rfl: 3  •  lovastatin (MEVACOR) 10 MG tablet, TAKE 1 TABLET BY MOUTH DAILY WITH SUPPER, Disp: 90 tablet, Rfl: 1  •  potassium chloride (K-DUR,KLOR-CON) 20 MEQ CR tablet, Take 1 tablet by mouth Daily., Disp: 2 tablet, Rfl: 0  •  spironolactone (ALDACTONE) 50 MG tablet, TAKE 1 TABLET BY MOUTH DAILY, Disp: 90 tablet, Rfl: 3  Allergies   Allergen Reactions   • Penicillins Hives   • Sulfa Antibiotics Hives       Past Medical History:   Diagnosis Date   • Atrial fibrillation (CMS/HCC)    • Cancer (CMS/HCC)    • Cholelithiasis    • Colon polyp    • Hyperlipidemia    • Hypertension      Past Surgical History:   Procedure Laterality Date   • APPENDECTOMY     • BREAST SURGERY     • COLONOSCOPY  10/2015    Polyps, fair prep, recall 1 year   • COLONOSCOPY      10 yr ago, Dr. Valiente   • HYSTERECTOMY     • MASTECTOMY      Left       Review of Systems   Constitutional: Negative.    HENT: Negative.   "  Eyes: Negative.    Respiratory: Negative.    Cardiovascular: Negative.    Gastrointestinal: Negative.    Endocrine: Negative.    Genitourinary: Negative.    Musculoskeletal: Negative.    Skin: Negative.    Allergic/Immunologic: Negative.    Neurological: Negative.    Hematological: Negative.    Psychiatric/Behavioral: Negative.        Objective  /76   Pulse 90   Temp 98.2 °F (36.8 °C) (Oral)   Resp 18   Ht 165.1 cm (65\")   Wt 83.9 kg (185 lb)   SpO2 96%   BMI 30.79 kg/m²   Physical Exam   Constitutional: She is oriented to person, place, and time. She appears well-developed and well-nourished.   HENT:   Head: Normocephalic and atraumatic.   Eyes: Pupils are equal, round, and reactive to light. Conjunctivae and EOM are normal.   Neck: Normal range of motion. Neck supple.   Cardiovascular: Normal rate, regular rhythm, normal heart sounds and intact distal pulses.    Pulmonary/Chest: Effort normal and breath sounds normal.   Abdominal: Soft. Bowel sounds are normal.   Musculoskeletal: Normal range of motion.   Neurological: She is alert and oriented to person, place, and time.   Skin: Skin is warm. Capillary refill takes less than 2 seconds.   Psychiatric: She has a normal mood and affect. Her behavior is normal. Judgment and thought content normal.   Vitals reviewed.      Assessment/Plan   Devora was seen today for hypertension and hyperlipidemia.    Diagnoses and all orders for this visit:    Essential hypertension    Mixed hyperlipidemia    Persistent atrial fibrillation (CMS/HCC)    Malignant neoplasm of upper-outer quadrant of left female breast, unspecified estrogen receptor status (CMS/HCC)      We reviewed labs  Patient's Body mass index is 30.79 kg/m². BMI is above normal parameters. Recommendations include: no follow-up required.        Current outpatient and discharge medications have been reconciled for the patient.  Reviewed by: Sandoval Christian MD  No orders of the defined types were " placed in this encounter.      Follow up: 6 month(s)

## 2018-09-27 RX ORDER — DILTIAZEM HYDROCHLORIDE 180 MG/1
CAPSULE, EXTENDED RELEASE ORAL
Qty: 90 CAPSULE | Refills: 0 | Status: SHIPPED | OUTPATIENT
Start: 2018-09-27 | End: 2018-12-28 | Stop reason: SDUPTHER

## 2018-10-02 RX ORDER — CARVEDILOL 25 MG/1
TABLET ORAL
Qty: 60 TABLET | Refills: 0 | Status: SHIPPED | OUTPATIENT
Start: 2018-10-02 | End: 2019-03-19 | Stop reason: SDUPTHER

## 2018-10-04 RX ORDER — HYDRALAZINE HYDROCHLORIDE 25 MG/1
TABLET, FILM COATED ORAL
Qty: 90 TABLET | Refills: 0 | Status: SHIPPED | OUTPATIENT
Start: 2018-10-04 | End: 2018-10-17 | Stop reason: SDUPTHER

## 2018-10-17 RX ORDER — HYDRALAZINE HYDROCHLORIDE 25 MG/1
TABLET, FILM COATED ORAL
Qty: 270 TABLET | Refills: 0 | Status: SHIPPED | OUTPATIENT
Start: 2018-10-17 | End: 2018-11-28 | Stop reason: SDUPTHER

## 2018-11-14 RX ORDER — SPIRONOLACTONE 50 MG/1
50 TABLET, FILM COATED ORAL DAILY
Qty: 90 TABLET | Refills: 0 | Status: SHIPPED | OUTPATIENT
Start: 2018-11-14 | End: 2019-07-30 | Stop reason: SDUPTHER

## 2018-11-27 RX ORDER — LOSARTAN POTASSIUM 100 MG/1
TABLET ORAL
Qty: 90 TABLET | Refills: 0 | Status: SHIPPED | OUTPATIENT
Start: 2018-11-27 | End: 2019-02-26 | Stop reason: SDUPTHER

## 2018-11-28 RX ORDER — HYDRALAZINE HYDROCHLORIDE 25 MG/1
TABLET, FILM COATED ORAL
Qty: 270 TABLET | Refills: 0 | Status: SHIPPED | OUTPATIENT
Start: 2018-11-28 | End: 2019-03-19 | Stop reason: SDUPTHER

## 2018-12-28 RX ORDER — APIXABAN 5 MG/1
TABLET, FILM COATED ORAL
Qty: 180 TABLET | Refills: 0 | Status: SHIPPED | OUTPATIENT
Start: 2018-12-28 | End: 2019-01-08 | Stop reason: SDUPTHER

## 2018-12-28 RX ORDER — DILTIAZEM HYDROCHLORIDE 180 MG/1
CAPSULE, EXTENDED RELEASE ORAL
Qty: 90 CAPSULE | Refills: 0 | Status: SHIPPED | OUTPATIENT
Start: 2018-12-28 | End: 2019-03-19 | Stop reason: SDUPTHER

## 2019-01-08 RX ORDER — APIXABAN 5 MG/1
TABLET, FILM COATED ORAL
Qty: 180 TABLET | Refills: 0 | Status: SHIPPED | OUTPATIENT
Start: 2019-01-08 | End: 2019-03-19 | Stop reason: SDUPTHER

## 2019-02-27 RX ORDER — LOSARTAN POTASSIUM 100 MG/1
TABLET ORAL
Qty: 90 TABLET | Refills: 0 | Status: SHIPPED | OUTPATIENT
Start: 2019-02-27 | End: 2019-04-03 | Stop reason: SDUPTHER

## 2019-03-14 DIAGNOSIS — E78.5 HYPERLIPIDEMIA, UNSPECIFIED HYPERLIPIDEMIA TYPE: ICD-10-CM

## 2019-03-14 DIAGNOSIS — I10 HYPERTENSION, UNSPECIFIED TYPE: Primary | ICD-10-CM

## 2019-03-14 LAB
ALBUMIN SERPL-MCNC: 3.7 G/DL (ref 3.5–5)
ALBUMIN/GLOB SERPL: 1.2 G/DL (ref 1.1–2.5)
ALP SERPL-CCNC: 56 U/L (ref 24–120)
ALT SERPL-CCNC: 21 U/L (ref 0–54)
AST SERPL-CCNC: 25 U/L (ref 7–45)
BASOPHILS # BLD AUTO: 0.06 10*3/MM3 (ref 0–0.2)
BASOPHILS NFR BLD AUTO: 1.1 % (ref 0–2)
BILIRUB SERPL-MCNC: 0.6 MG/DL (ref 0.1–1)
BUN SERPL-MCNC: 23 MG/DL (ref 5–21)
BUN/CREAT SERPL: 24.7 (ref 7–25)
CALCIUM SERPL-MCNC: 9.8 MG/DL (ref 8.4–10.4)
CHLORIDE SERPL-SCNC: 97 MMOL/L (ref 98–110)
CHOLEST SERPL-MCNC: 162 MG/DL (ref 130–200)
CO2 SERPL-SCNC: 32 MMOL/L (ref 24–31)
CREAT SERPL-MCNC: 0.93 MG/DL (ref 0.5–1.4)
EOSINOPHIL # BLD AUTO: 0.17 10*3/MM3 (ref 0–0.7)
EOSINOPHIL NFR BLD AUTO: 3.2 % (ref 0–4)
ERYTHROCYTE [DISTWIDTH] IN BLOOD BY AUTOMATED COUNT: 13.5 % (ref 12–15)
GLOBULIN SER CALC-MCNC: 3 GM/DL
GLUCOSE SERPL-MCNC: 94 MG/DL (ref 70–100)
HCT VFR BLD AUTO: 36.7 % (ref 37–47)
HDLC SERPL-MCNC: 59 MG/DL
HGB BLD-MCNC: 11.7 G/DL (ref 12–16)
IMM GRANULOCYTES # BLD AUTO: 0.02 10*3/MM3 (ref 0–0.05)
IMM GRANULOCYTES NFR BLD AUTO: 0.4 % (ref 0–5)
LDLC SERPL CALC-MCNC: 89 MG/DL (ref 0–99)
LYMPHOCYTES # BLD AUTO: 0.95 10*3/MM3 (ref 0.72–4.86)
LYMPHOCYTES NFR BLD AUTO: 18 % (ref 15–45)
MCH RBC QN AUTO: 29.8 PG (ref 28–32)
MCHC RBC AUTO-ENTMCNC: 31.9 G/DL (ref 33–36)
MCV RBC AUTO: 93.4 FL (ref 82–98)
MONOCYTES # BLD AUTO: 0.63 10*3/MM3 (ref 0.19–1.3)
MONOCYTES NFR BLD AUTO: 11.9 % (ref 4–12)
NEUTROPHILS # BLD AUTO: 3.46 10*3/MM3 (ref 1.87–8.4)
NEUTROPHILS NFR BLD AUTO: 65.4 % (ref 39–78)
NRBC BLD AUTO-RTO: 0 /100 WBC (ref 0–0)
PLATELET # BLD AUTO: 154 10*3/MM3 (ref 130–400)
POTASSIUM SERPL-SCNC: 4.4 MMOL/L (ref 3.5–5.3)
PROT SERPL-MCNC: 6.7 G/DL (ref 6.3–8.7)
RBC # BLD AUTO: 3.93 10*6/MM3 (ref 4.2–5.4)
SODIUM SERPL-SCNC: 139 MMOL/L (ref 135–145)
TRIGL SERPL-MCNC: 69 MG/DL (ref 0–149)
TSH SERPL DL<=0.005 MIU/L-ACNC: 1.93 MIU/ML (ref 0.47–4.68)
VLDLC SERPL CALC-MCNC: 13.8 MG/DL
WBC # BLD AUTO: 5.29 10*3/MM3 (ref 4.8–10.8)

## 2019-03-19 ENCOUNTER — OFFICE VISIT (OUTPATIENT)
Dept: FAMILY MEDICINE CLINIC | Facility: CLINIC | Age: 84
End: 2019-03-19

## 2019-03-19 VITALS
TEMPERATURE: 98.8 F | BODY MASS INDEX: 30.49 KG/M2 | WEIGHT: 183 LBS | HEART RATE: 76 BPM | RESPIRATION RATE: 16 BRPM | DIASTOLIC BLOOD PRESSURE: 80 MMHG | SYSTOLIC BLOOD PRESSURE: 138 MMHG | OXYGEN SATURATION: 98 % | HEIGHT: 65 IN

## 2019-03-19 DIAGNOSIS — E78.2 MIXED HYPERLIPIDEMIA: ICD-10-CM

## 2019-03-19 DIAGNOSIS — I10 ESSENTIAL HYPERTENSION: Primary | ICD-10-CM

## 2019-03-19 DIAGNOSIS — I48.19 PERSISTENT ATRIAL FIBRILLATION (HCC): ICD-10-CM

## 2019-03-19 DIAGNOSIS — M25.511 BILATERAL SHOULDER PAIN, UNSPECIFIED CHRONICITY: ICD-10-CM

## 2019-03-19 DIAGNOSIS — M25.512 BILATERAL SHOULDER PAIN, UNSPECIFIED CHRONICITY: ICD-10-CM

## 2019-03-19 PROCEDURE — G0439 PPPS, SUBSEQ VISIT: HCPCS | Performed by: FAMILY MEDICINE

## 2019-03-19 RX ORDER — HYDRALAZINE HYDROCHLORIDE 25 MG/1
25 TABLET, FILM COATED ORAL 3 TIMES DAILY
Qty: 270 TABLET | Refills: 1 | Status: SHIPPED | OUTPATIENT
Start: 2019-03-19 | End: 2019-09-30 | Stop reason: SDUPTHER

## 2019-03-19 RX ORDER — DILTIAZEM HYDROCHLORIDE 180 MG/1
180 CAPSULE, EXTENDED RELEASE ORAL DAILY
Qty: 90 CAPSULE | Refills: 1 | Status: SHIPPED | OUTPATIENT
Start: 2019-03-19 | End: 2019-12-30

## 2019-03-19 RX ORDER — LOTEPREDNOL ETABONATE 5 MG/ML
1 SUSPENSION/ DROPS OPHTHALMIC 4 TIMES DAILY
Qty: 3 EACH | Refills: 1 | Status: SHIPPED | OUTPATIENT
Start: 2019-03-19

## 2019-03-19 NOTE — PROGRESS NOTES
QUICK REFERENCE INFORMATION:  The ABCs of the Annual Wellness Visit    Subsequent Medicare Wellness Visit    HEALTH RISK ASSESSMENT    1934    Recent Hospitalizations:  No hospitalization(s) within the last year..        Current Medical Providers:  Patient Care Team:  Sandoval Christian MD as PCP - General (Family Medicine)  Sandoval Christian MD as PCP - Claims Attributed  Kong Tse MD as Cardiologist (Cardiology)        Smoking Status:  Social History     Tobacco Use   Smoking Status Never Smoker       Alcohol Consumption:  Social History     Substance and Sexual Activity   Alcohol Use No       Depression Screen:   PHQ-2/PHQ-9 Depression Screening 3/19/2019   Little interest or pleasure in doing things 0   Feeling down, depressed, or hopeless 0   Trouble falling or staying asleep, or sleeping too much 1   Feeling tired or having little energy 0   Poor appetite or overeating 0   Feeling bad about yourself - or that you are a failure or have let yourself or your family down 0   Trouble concentrating on things, such as reading the newspaper or watching television 0   Moving or speaking so slowly that other people could have noticed. Or the opposite - being so fidgety or restless that you have been moving around a lot more than usual 0   Thoughts that you would be better off dead, or of hurting yourself in some way 0   Total Score 1   If you checked off any problems, how difficult have these problems made it for you to do your work, take care of things at home, or get along with other people? Not difficult at all       Health Habits and Functional and Cognitive Screening:  Functional & Cognitive Status 3/19/2019   Do you have difficulty preparing food and eating? No   Do you have difficulty bathing yourself, getting dressed or grooming yourself? No   Do you have difficulty using the toilet? No   Do you have difficulty moving around from place to place? No   Do you have trouble with steps or  getting out of a bed or a chair? No   In the past year have you fallen or experienced a near fall? No   Current Diet Low Fat Diet   Dental Exam Up to date   Eye Exam Up to date   Exercise (times per week) 1 times per week   Current Exercise Activities Include Housecleaning   Do you need help using the phone?  No   Are you deaf or do you have serious difficulty hearing?  No   Do you need help with transportation? No   Do you need help shopping? No   Do you need help preparing meals?  No   Do you need help with housework?  No   Do you need help with laundry? No   Do you need help taking your medications? No   Do you need help managing money? No   Do you ever drive or ride in a car without wearing a seat belt? No   Have you felt unusual stress, anger or loneliness in the last month? No   Who do you live with? Alone   If you need help, do you have trouble finding someone available to you? No   Have you been bothered in the last four weeks by sexual problems? No   Do you have difficulty concentrating, remembering or making decisions? No           Does the patient have evidence of cognitive impairment? No    Aspirin use counseling: Does not need ASA (and currently is not on it)      Recent Lab Results:  CMP:  Lab Results   Component Value Date    GLU 94 03/14/2019    BUN 23 (H) 03/14/2019    CREATININE 0.93 03/14/2019    EGFRIFNONA 57 (L) 03/14/2019    EGFRIFAFRI 70 03/14/2019    BCR 24.7 03/14/2019     03/14/2019    K 4.4 03/14/2019    CO2 32.0 (H) 03/14/2019    CALCIUM 9.8 03/14/2019    PROTENTOTREF 6.7 03/14/2019    ALBUMIN 3.70 03/14/2019    LABGLOBREF 3.0 03/14/2019    LABIL2 1.2 03/14/2019    BILITOT 0.6 03/14/2019    ALKPHOS 56 03/14/2019    AST 25 03/14/2019    ALT 21 03/14/2019     Lipid Panel:  Lab Results   Component Value Date    TRIG 69 03/14/2019    HDL 59 03/14/2019    VLDL 13.8 03/14/2019     HbA1c:       Visual Acuity:   Visual Acuity Screening    Right eye Left eye Both eyes   Without correction:       With correction: 20/50 20/50 20/50       Age-appropriate Screening Schedule:  Refer to the list below for future screening recommendations based on patient's age, sex and/or medical conditions. Orders for these recommended tests are listed in the plan section. The patient has been provided with a written plan.    Health Maintenance   Topic Date Due   • TDAP/TD VACCINES (1 - Tdap) 06/13/1953   • ZOSTER VACCINE (1 of 2) 06/13/1984   • PNEUMOCOCCAL VACCINES (65+ LOW/MEDIUM RISK) (1 of 2 - PCV13) 06/13/1999   • INFLUENZA VACCINE  08/01/2018   • LIPID PANEL  03/14/2020        Subjective   History of Present Illness    Devora Grant is a 84 y.o. female who presents for an Subsequent Wellness Visit.    The following portions of the patient's history were reviewed and updated as appropriate: allergies, current medications, past family history, past medical history, past social history, past surgical history and problem list.    Outpatient Medications Prior to Visit   Medication Sig Dispense Refill   • Calcium Citrate-Vitamin D (CALCIUM + D PO) Take 500 mg by mouth daily.     • carvedilol (COREG) 25 MG tablet Take 1 tablet by mouth Daily. 90 tablet 1   • diltiaZEM (TIAZAC) 180 MG 24 hr capsule TAKE 1 CAPSULE BY MOUTH EVERY DAY 90 capsule 0   • ELIQUIS 5 MG tablet tablet TAKE 1 TABLET BY MOUTH EVERY 12 HOURS 180 tablet 0   • hydrALAZINE (APRESOLINE) 25 MG tablet Take 1 tablet by mouth 3 (Three) Times a Day. 90 tablet 5   • losartan (COZAAR) 100 MG tablet TAKE 1 TABLET BY MOUTH DAILY 90 tablet 0   • LOTEMAX 0.5 % ophthalmic suspension SHAKE WELL AND INT 1 GTT IN OS D  3   • lovastatin (MEVACOR) 10 MG tablet TAKE 1 TABLET BY MOUTH DAILY WITH SUPPER 90 tablet 1   • potassium chloride (K-DUR,KLOR-CON) 20 MEQ CR tablet Take 1 tablet by mouth Daily. 2 tablet 0   • spironolactone (ALDACTONE) 50 MG tablet TAKE 1 TABLET BY MOUTH DAILY 90 tablet 0   • carvedilol (COREG) 25 MG tablet TAKE 1 TABLET BY MOUTH DAILY 90 tablet 0   •  "carvedilol (COREG) 25 MG tablet TAKE 1 TABLET BY MOUTH TWICE DAILY WITH MEALS 60 tablet 0   • hydrALAZINE (APRESOLINE) 25 MG tablet TAKE 1 TABLET BY MOUTH THREE TIMES DAILY 270 tablet 0     No facility-administered medications prior to visit.        Patient Active Problem List   Diagnosis   • Essential hypertension   • Hyperlipidemia   • Persistent atrial fibrillation (CMS/HCC)   • Malignant neoplasm of upper-outer quadrant of left female breast (CMS/HCC)   • Bilateral shoulder pain   • Hypokalemia       Advance Care Planning:  has an advance directive - a copy HAS NOT been provided. Have asked the patient to send this to us to add to record.    Identification of Risk Factors:  Risk factors include: cardiovascular risk.    Review of Systems    Compared to one year ago, the patient feels her physical health is the same.  Compared to one year ago, the patient feels her mental health is the same.    Objective     Physical Exam    Vitals:    03/19/19 0832   BP: 138/80   Pulse: 76   Resp: 16   Temp: 98.8 °F (37.1 °C)   SpO2: 98%   Weight: 83 kg (183 lb)   Height: 165.1 cm (65\")   PainSc:   4   PainLoc: Shoulder       Patient's Body mass index is 30.45 kg/m². BMI is above normal parameters. Recommendations include: nutrition counseling.      Assessment/Plan   Patient Self-Management and Personalized Health Advice  The patient has been provided with information about: fall prevention and preventive services including:   · Fall Risk assessment done.    Visit Diagnoses:    ICD-10-CM ICD-9-CM   1. Essential hypertension I10 401.9   2. Mixed hyperlipidemia E78.2 272.2   3. Persistent atrial fibrillation (CMS/HCC) I48.1 427.31   4. Bilateral shoulder pain, unspecified chronicity M25.511 719.41    M25.512        No orders of the defined types were placed in this encounter.      Outpatient Encounter Medications as of 3/19/2019   Medication Sig Dispense Refill   • Calcium Citrate-Vitamin D (CALCIUM + D PO) Take 500 mg by mouth " daily.     • carvedilol (COREG) 25 MG tablet Take 1 tablet by mouth Daily. 90 tablet 1   • diltiaZEM (TIAZAC) 180 MG 24 hr capsule TAKE 1 CAPSULE BY MOUTH EVERY DAY 90 capsule 0   • ELIQUIS 5 MG tablet tablet TAKE 1 TABLET BY MOUTH EVERY 12 HOURS 180 tablet 0   • hydrALAZINE (APRESOLINE) 25 MG tablet Take 1 tablet by mouth 3 (Three) Times a Day. 90 tablet 5   • losartan (COZAAR) 100 MG tablet TAKE 1 TABLET BY MOUTH DAILY 90 tablet 0   • LOTEMAX 0.5 % ophthalmic suspension SHAKE WELL AND INT 1 GTT IN OS D  3   • lovastatin (MEVACOR) 10 MG tablet TAKE 1 TABLET BY MOUTH DAILY WITH SUPPER 90 tablet 1   • potassium chloride (K-DUR,KLOR-CON) 20 MEQ CR tablet Take 1 tablet by mouth Daily. 2 tablet 0   • spironolactone (ALDACTONE) 50 MG tablet TAKE 1 TABLET BY MOUTH DAILY 90 tablet 0   • [DISCONTINUED] carvedilol (COREG) 25 MG tablet TAKE 1 TABLET BY MOUTH DAILY 90 tablet 0   • [DISCONTINUED] carvedilol (COREG) 25 MG tablet TAKE 1 TABLET BY MOUTH TWICE DAILY WITH MEALS 60 tablet 0   • [DISCONTINUED] hydrALAZINE (APRESOLINE) 25 MG tablet TAKE 1 TABLET BY MOUTH THREE TIMES DAILY 270 tablet 0     No facility-administered encounter medications on file as of 3/19/2019.        Reviewed use of high risk medication in the elderly: yes  Reviewed for potential of harmful drug interactions in the elderly: yes    Follow Up:  No Follow-up on file.     An After Visit Summary and PPPS with all of these plans were given to the patient.

## 2019-03-19 NOTE — PROGRESS NOTES
Subjective   Devora Grant is a 84 y.o. female.     Chief Complaint   Patient presents with   • Follow-up     6mo htn        History of Present Illness     she nots good bp control witout cp or ha---she is toleraitng eliquis for afib...she is tolerating statin drugs witout myalgis ---her arthritis symptoms are stable      Current Outpatient Medications:   •  Calcium Citrate-Vitamin D (CALCIUM + D PO), Take 500 mg by mouth daily., Disp: , Rfl:   •  carvedilol (COREG) 25 MG tablet, Take 1 tablet by mouth Daily., Disp: 90 tablet, Rfl: 1  •  diltiaZEM (TIAZAC) 180 MG 24 hr capsule, TAKE 1 CAPSULE BY MOUTH EVERY DAY, Disp: 90 capsule, Rfl: 0  •  ELIQUIS 5 MG tablet tablet, TAKE 1 TABLET BY MOUTH EVERY 12 HOURS, Disp: 180 tablet, Rfl: 0  •  hydrALAZINE (APRESOLINE) 25 MG tablet, Take 1 tablet by mouth 3 (Three) Times a Day., Disp: 90 tablet, Rfl: 5  •  losartan (COZAAR) 100 MG tablet, TAKE 1 TABLET BY MOUTH DAILY, Disp: 90 tablet, Rfl: 0  •  LOTEMAX 0.5 % ophthalmic suspension, SHAKE WELL AND INT 1 GTT IN OS D, Disp: , Rfl: 3  •  lovastatin (MEVACOR) 10 MG tablet, TAKE 1 TABLET BY MOUTH DAILY WITH SUPPER, Disp: 90 tablet, Rfl: 1  •  potassium chloride (K-DUR,KLOR-CON) 20 MEQ CR tablet, Take 1 tablet by mouth Daily., Disp: 2 tablet, Rfl: 0  •  spironolactone (ALDACTONE) 50 MG tablet, TAKE 1 TABLET BY MOUTH DAILY, Disp: 90 tablet, Rfl: 0  Allergies   Allergen Reactions   • Penicillins Hives   • Sulfa Antibiotics Hives       Past Medical History:   Diagnosis Date   • Atrial fibrillation (CMS/HCC)    • Cancer (CMS/HCC)    • Cholelithiasis    • Colon polyp    • Hyperlipidemia    • Hypertension      Past Surgical History:   Procedure Laterality Date   • APPENDECTOMY     • BREAST SURGERY     • COLONOSCOPY  10/2015    Polyps, fair prep, recall 1 year   • COLONOSCOPY      10 yr ago, Dr. Valiente   • HYSTERECTOMY     • MASTECTOMY      Left       Review of Systems   Constitutional: Negative.    HENT: Negative.    Eyes: Negative.   "  Respiratory: Negative.    Cardiovascular: Negative.    Gastrointestinal: Negative.    Endocrine: Negative.    Genitourinary: Negative.    Musculoskeletal: Positive for arthralgias.   Skin: Negative.    Allergic/Immunologic: Negative.    Neurological: Negative.    Hematological: Negative.    Psychiatric/Behavioral: Negative.        Objective  /80   Pulse 76   Temp 98.8 °F (37.1 °C)   Resp 16   Ht 165.1 cm (65\")   Wt 83 kg (183 lb)   SpO2 98%   BMI 30.45 kg/m²   Physical Exam   Constitutional: She is oriented to person, place, and time. She appears well-developed and well-nourished.   HENT:   Head: Normocephalic and atraumatic.   Right Ear: External ear normal.   Left Ear: External ear normal.   Nose: Nose normal.   Mouth/Throat: Oropharynx is clear and moist.   Eyes: Conjunctivae and EOM are normal. Pupils are equal, round, and reactive to light.   Neck: Normal range of motion. Neck supple.   Cardiovascular: Normal rate, normal heart sounds and intact distal pulses.   Pulmonary/Chest: Effort normal and breath sounds normal.   Abdominal: Soft. Bowel sounds are normal.   Musculoskeletal: Normal range of motion.   Neurological: She is alert and oriented to person, place, and time.   Skin: Skin is warm. Capillary refill takes less than 2 seconds.   Psychiatric: She has a normal mood and affect. Her behavior is normal. Judgment and thought content normal.   Nursing note and vitals reviewed.      Assessment/Plan   Devora was seen today for follow-up.    Diagnoses and all orders for this visit:    Essential hypertension    Mixed hyperlipidemia    Persistent atrial fibrillation (CMS/HCC)    Bilateral shoulder pain, unspecified chronicity      We discussed her recentl labs           No orders of the defined types were placed in this encounter.      Follow up: 6 month(s)  "

## 2019-04-03 RX ORDER — DILTIAZEM HYDROCHLORIDE 180 MG/1
CAPSULE, EXTENDED RELEASE ORAL
Qty: 90 CAPSULE | Refills: 0 | Status: SHIPPED | OUTPATIENT
Start: 2019-04-03 | End: 2019-07-30 | Stop reason: SDUPTHER

## 2019-04-03 RX ORDER — LOVASTATIN 10 MG/1
TABLET ORAL
Qty: 90 TABLET | Refills: 0 | Status: SHIPPED | OUTPATIENT
Start: 2019-04-03 | End: 2019-09-30 | Stop reason: SDUPTHER

## 2019-04-03 RX ORDER — LOSARTAN POTASSIUM 100 MG/1
TABLET ORAL
Qty: 90 TABLET | Refills: 0 | Status: SHIPPED | OUTPATIENT
Start: 2019-04-03 | End: 2019-07-02 | Stop reason: SDUPTHER

## 2019-07-02 RX ORDER — LOSARTAN POTASSIUM 100 MG/1
TABLET ORAL
Qty: 90 TABLET | Refills: 0 | Status: SHIPPED | OUTPATIENT
Start: 2019-07-02 | End: 2020-03-05

## 2019-07-02 RX ORDER — SPIRONOLACTONE 50 MG/1
50 TABLET, FILM COATED ORAL DAILY
Qty: 90 TABLET | Refills: 0 | Status: SHIPPED | OUTPATIENT
Start: 2019-07-02 | End: 2019-07-30 | Stop reason: SDUPTHER

## 2019-07-08 RX ORDER — APIXABAN 5 MG/1
TABLET, FILM COATED ORAL
Qty: 180 TABLET | Refills: 1 | Status: SHIPPED | OUTPATIENT
Start: 2019-07-08 | End: 2020-04-24 | Stop reason: SDUPTHER

## 2019-07-30 ENCOUNTER — OFFICE VISIT (OUTPATIENT)
Dept: FAMILY MEDICINE CLINIC | Facility: CLINIC | Age: 84
End: 2019-07-30

## 2019-07-30 VITALS
SYSTOLIC BLOOD PRESSURE: 122 MMHG | TEMPERATURE: 98.2 F | OXYGEN SATURATION: 98 % | WEIGHT: 178.3 LBS | HEIGHT: 65 IN | RESPIRATION RATE: 16 BRPM | BODY MASS INDEX: 29.71 KG/M2 | HEART RATE: 54 BPM | DIASTOLIC BLOOD PRESSURE: 64 MMHG

## 2019-07-30 DIAGNOSIS — I10 ESSENTIAL HYPERTENSION: Primary | ICD-10-CM

## 2019-07-30 DIAGNOSIS — I48.19 PERSISTENT ATRIAL FIBRILLATION (HCC): ICD-10-CM

## 2019-07-30 PROCEDURE — 99213 OFFICE O/P EST LOW 20 MIN: CPT | Performed by: FAMILY MEDICINE

## 2019-07-30 RX ORDER — SPIRONOLACTONE 50 MG/1
50 TABLET, FILM COATED ORAL DAILY
Qty: 90 TABLET | Refills: 2 | Status: SHIPPED | OUTPATIENT
Start: 2019-07-30 | End: 2020-06-25

## 2019-07-30 NOTE — PROGRESS NOTES
Subjective   Devora Grant is a 85 y.o. female.     Chief Complaint   Patient presents with   • Follow-up     hospital follow up      History of Present Illness     recently was admitted to the hosppital after sustaining a fall---has medical alert alarm now...      Current Outpatient Medications:   •  Calcium Citrate-Vitamin D (CALCIUM + D PO), Take 500 mg by mouth daily., Disp: , Rfl:   •  carvedilol (COREG) 25 MG tablet, Take 1 tablet by mouth Daily., Disp: 90 tablet, Rfl: 1  •  diltiaZEM (TIAZAC) 180 MG 24 hr capsule, Take 1 capsule by mouth Daily., Disp: 90 capsule, Rfl: 1  •  ELIQUIS 5 MG tablet tablet, TAKE 1 TABLET BY MOUTH EVERY 12 HOURS, Disp: 180 tablet, Rfl: 1  •  hydrALAZINE (APRESOLINE) 25 MG tablet, Take 1 tablet by mouth 3 (Three) Times a Day., Disp: 270 tablet, Rfl: 1  •  losartan (COZAAR) 100 MG tablet, TAKE 1 TABLET BY MOUTH DAILY, Disp: 90 tablet, Rfl: 0  •  LOTEMAX 0.5 % ophthalmic suspension, Apply 1 drop to eye(s) as directed by provider 4 (Four) Times a Day., Disp: 3 each, Rfl: 1  •  lovastatin (MEVACOR) 10 MG tablet, TAKE 1 TABLET BY MOUTH DAILY, Disp: 90 tablet, Rfl: 0  •  potassium chloride (K-DUR,KLOR-CON) 20 MEQ CR tablet, Take 1 tablet by mouth Daily., Disp: 2 tablet, Rfl: 0  •  spironolactone (ALDACTONE) 50 MG tablet, Take 1 tablet by mouth Daily., Disp: 90 tablet, Rfl: 2  Allergies   Allergen Reactions   • Penicillins Hives   • Sulfa Antibiotics Hives       Past Medical History:   Diagnosis Date   • Atrial fibrillation (CMS/HCC)    • Cancer (CMS/HCC)    • Cholelithiasis    • Colon polyp    • Hyperlipidemia    • Hypertension      Past Surgical History:   Procedure Laterality Date   • APPENDECTOMY     • BREAST SURGERY     • COLONOSCOPY  10/2015    Polyps, fair prep, recall 1 year   • COLONOSCOPY      10 yr ago, Dr. Valiente   • HYSTERECTOMY     • MASTECTOMY      Left       Review of Systems   Constitutional: Negative.    HENT: Negative.    Eyes: Negative.    Respiratory: Negative.   "  Cardiovascular: Negative.    Gastrointestinal: Negative.    Endocrine: Negative.    Genitourinary: Negative.    Musculoskeletal: Negative.    Skin: Negative.    Allergic/Immunologic: Negative.    Neurological: Negative.    Hematological: Negative.    Psychiatric/Behavioral: Negative.        Objective  /64   Pulse 54   Temp 98.2 °F (36.8 °C)   Resp 16   Ht 165.1 cm (65\")   Wt 80.9 kg (178 lb 4.8 oz)   SpO2 98%   BMI 29.67 kg/m²   Physical Exam   Constitutional: She is oriented to person, place, and time. She appears well-developed and well-nourished.   HENT:   Head: Normocephalic and atraumatic.   Right Ear: External ear normal.   Left Ear: External ear normal.   Nose: Nose normal.   Mouth/Throat: Oropharynx is clear and moist.   Eyes: Conjunctivae and EOM are normal. Pupils are equal, round, and reactive to light.   Neck: Normal range of motion. Neck supple.   Cardiovascular: Normal rate, regular rhythm, normal heart sounds and intact distal pulses.   Pulmonary/Chest: Effort normal and breath sounds normal.   Abdominal: Soft. Bowel sounds are normal.   Musculoskeletal: Normal range of motion.   Neurological: She is alert and oriented to person, place, and time.   Skin: Skin is warm. Capillary refill takes less than 2 seconds.   Psychiatric: She has a normal mood and affect. Her behavior is normal. Judgment and thought content normal.   Nursing note and vitals reviewed.      Assessment/Plan   Devora was seen today for follow-up.    Diagnoses and all orders for this visit:    Essential hypertension    Persistent atrial fibrillation (CMS/HCC)    Other orders  -     spironolactone (ALDACTONE) 50 MG tablet; Take 1 tablet by mouth Daily.      Patient's Body mass index is 29.67 kg/m². BMI is above normal parameters. Recommendations include: nutrition counseling.           No orders of the defined types were placed in this encounter.    Current outpatient and discharge medications have been reconciled for the " patient.  Reviewed by: Sandoval Christian MD  Follow up: 6 month(s)

## 2019-08-23 RX ORDER — CARVEDILOL 25 MG/1
TABLET ORAL
Qty: 60 TABLET | Refills: 0 | Status: SHIPPED | OUTPATIENT
Start: 2019-08-23 | End: 2019-11-04 | Stop reason: SDUPTHER

## 2019-08-23 RX ORDER — LOSARTAN POTASSIUM 100 MG/1
TABLET ORAL
Qty: 90 TABLET | Refills: 0 | Status: SHIPPED | OUTPATIENT
Start: 2019-08-23 | End: 2020-04-24

## 2019-09-12 DIAGNOSIS — E78.2 MIXED HYPERLIPIDEMIA: ICD-10-CM

## 2019-09-12 DIAGNOSIS — I10 ESSENTIAL HYPERTENSION: Primary | ICD-10-CM

## 2019-09-14 LAB
ALBUMIN SERPL-MCNC: 4 G/DL (ref 3.5–5.2)
ALBUMIN/GLOB SERPL: 1.5 G/DL
ALP SERPL-CCNC: 61 U/L (ref 39–117)
ALT SERPL-CCNC: 21 U/L (ref 1–33)
AST SERPL-CCNC: 19 U/L (ref 1–32)
BASOPHILS # BLD AUTO: 0.07 10*3/MM3 (ref 0–0.2)
BASOPHILS NFR BLD AUTO: 1.4 % (ref 0–1.5)
BILIRUB SERPL-MCNC: 0.4 MG/DL (ref 0.2–1.2)
BUN SERPL-MCNC: 23 MG/DL (ref 8–23)
BUN/CREAT SERPL: 24.7 (ref 7–25)
CALCIUM SERPL-MCNC: 10.2 MG/DL (ref 8.6–10.5)
CHLORIDE SERPL-SCNC: 96 MMOL/L (ref 98–107)
CHOLEST SERPL-MCNC: 152 MG/DL (ref 0–200)
CO2 SERPL-SCNC: 29.7 MMOL/L (ref 22–29)
CREAT SERPL-MCNC: 0.93 MG/DL (ref 0.57–1)
EOSINOPHIL # BLD AUTO: 0.19 10*3/MM3 (ref 0–0.4)
EOSINOPHIL NFR BLD AUTO: 3.8 % (ref 0.3–6.2)
ERYTHROCYTE [DISTWIDTH] IN BLOOD BY AUTOMATED COUNT: 13.6 % (ref 12.3–15.4)
GLOBULIN SER CALC-MCNC: 2.6 GM/DL
GLUCOSE SERPL-MCNC: 94 MG/DL (ref 65–99)
HCT VFR BLD AUTO: 36.2 % (ref 34–46.6)
HDLC SERPL-MCNC: 56 MG/DL (ref 40–60)
HGB BLD-MCNC: 11.4 G/DL (ref 12–15.9)
IMM GRANULOCYTES # BLD AUTO: 0.01 10*3/MM3 (ref 0–0.05)
IMM GRANULOCYTES NFR BLD AUTO: 0.2 % (ref 0–0.5)
LDLC SERPL CALC-MCNC: 85 MG/DL (ref 0–100)
LYMPHOCYTES # BLD AUTO: 0.76 10*3/MM3 (ref 0.7–3.1)
LYMPHOCYTES NFR BLD AUTO: 15.3 % (ref 19.6–45.3)
MCH RBC QN AUTO: 30.4 PG (ref 26.6–33)
MCHC RBC AUTO-ENTMCNC: 31.5 G/DL (ref 31.5–35.7)
MCV RBC AUTO: 96.5 FL (ref 79–97)
MONOCYTES # BLD AUTO: 0.49 10*3/MM3 (ref 0.1–0.9)
MONOCYTES NFR BLD AUTO: 9.8 % (ref 5–12)
NEUTROPHILS # BLD AUTO: 3.46 10*3/MM3 (ref 1.7–7)
NEUTROPHILS NFR BLD AUTO: 69.5 % (ref 42.7–76)
NRBC BLD AUTO-RTO: 0 /100 WBC (ref 0–0.2)
PLATELET # BLD AUTO: 154 10*3/MM3 (ref 140–450)
POTASSIUM SERPL-SCNC: 4.3 MMOL/L (ref 3.5–5.2)
PROT SERPL-MCNC: 6.6 G/DL (ref 6–8.5)
RBC # BLD AUTO: 3.75 10*6/MM3 (ref 3.77–5.28)
SODIUM SERPL-SCNC: 137 MMOL/L (ref 136–145)
TRIGL SERPL-MCNC: 54 MG/DL (ref 0–150)
TSH SERPL DL<=0.005 MIU/L-ACNC: 1.68 UIU/ML (ref 0.27–4.2)
VLDLC SERPL CALC-MCNC: 10.8 MG/DL
WBC # BLD AUTO: 4.98 10*3/MM3 (ref 3.4–10.8)

## 2019-09-17 ENCOUNTER — OFFICE VISIT (OUTPATIENT)
Dept: FAMILY MEDICINE CLINIC | Facility: CLINIC | Age: 84
End: 2019-09-17

## 2019-09-17 VITALS
HEIGHT: 65 IN | BODY MASS INDEX: 29.32 KG/M2 | DIASTOLIC BLOOD PRESSURE: 80 MMHG | WEIGHT: 176 LBS | OXYGEN SATURATION: 97 % | SYSTOLIC BLOOD PRESSURE: 124 MMHG | HEART RATE: 63 BPM | RESPIRATION RATE: 16 BRPM

## 2019-09-17 DIAGNOSIS — E78.2 MIXED HYPERLIPIDEMIA: ICD-10-CM

## 2019-09-17 DIAGNOSIS — J30.1 SEASONAL ALLERGIC RHINITIS DUE TO POLLEN: ICD-10-CM

## 2019-09-17 DIAGNOSIS — I48.19 PERSISTENT ATRIAL FIBRILLATION (HCC): ICD-10-CM

## 2019-09-17 DIAGNOSIS — I10 ESSENTIAL HYPERTENSION: Primary | ICD-10-CM

## 2019-09-17 PROCEDURE — 99214 OFFICE O/P EST MOD 30 MIN: CPT | Performed by: FAMILY MEDICINE

## 2019-09-30 RX ORDER — LOVASTATIN 10 MG/1
TABLET ORAL
Qty: 90 TABLET | Refills: 0 | Status: SHIPPED | OUTPATIENT
Start: 2019-09-30 | End: 2019-12-30

## 2019-09-30 RX ORDER — HYDRALAZINE HYDROCHLORIDE 25 MG/1
TABLET, FILM COATED ORAL
Qty: 270 TABLET | Refills: 0 | Status: SHIPPED | OUTPATIENT
Start: 2019-09-30 | End: 2019-12-30

## 2019-10-02 RX ORDER — APIXABAN 5 MG/1
TABLET, FILM COATED ORAL
Qty: 180 TABLET | Refills: 0 | Status: SHIPPED | OUTPATIENT
Start: 2019-10-02 | End: 2019-12-30

## 2019-11-05 RX ORDER — CARVEDILOL 25 MG/1
TABLET ORAL
Qty: 60 TABLET | Refills: 5 | Status: SHIPPED | OUTPATIENT
Start: 2019-11-05 | End: 2020-03-27

## 2019-12-30 RX ORDER — DILTIAZEM HYDROCHLORIDE 180 MG/1
CAPSULE, EXTENDED RELEASE ORAL
Qty: 90 CAPSULE | Refills: 1 | Status: SHIPPED | OUTPATIENT
Start: 2019-12-30 | End: 2020-04-24 | Stop reason: SDUPTHER

## 2019-12-30 RX ORDER — HYDRALAZINE HYDROCHLORIDE 25 MG/1
TABLET, FILM COATED ORAL
Qty: 270 TABLET | Refills: 0 | Status: SHIPPED | OUTPATIENT
Start: 2019-12-30 | End: 2020-03-27

## 2019-12-30 RX ORDER — LOVASTATIN 10 MG/1
TABLET ORAL
Qty: 90 TABLET | Refills: 0 | Status: SHIPPED | OUTPATIENT
Start: 2019-12-30 | End: 2020-06-25

## 2019-12-30 RX ORDER — DILTIAZEM HYDROCHLORIDE 180 MG/1
CAPSULE, COATED, EXTENDED RELEASE ORAL
Qty: 90 CAPSULE | Refills: 1 | Status: SHIPPED | OUTPATIENT
Start: 2019-12-30 | End: 2021-03-10 | Stop reason: SDUPTHER

## 2019-12-30 RX ORDER — APIXABAN 5 MG/1
TABLET, FILM COATED ORAL
Qty: 180 TABLET | Refills: 0 | Status: SHIPPED | OUTPATIENT
Start: 2019-12-30 | End: 2020-03-27

## 2019-12-30 RX ORDER — LOVASTATIN 10 MG/1
TABLET ORAL
Qty: 90 TABLET | Refills: 0 | Status: SHIPPED | OUTPATIENT
Start: 2019-12-30 | End: 2019-12-30

## 2020-03-05 RX ORDER — LOSARTAN POTASSIUM 100 MG/1
TABLET ORAL
Qty: 90 TABLET | Refills: 0 | Status: SHIPPED | OUTPATIENT
Start: 2020-03-05 | End: 2020-03-27

## 2020-03-12 DIAGNOSIS — E78.2 MIXED HYPERLIPIDEMIA: ICD-10-CM

## 2020-03-12 DIAGNOSIS — I10 ESSENTIAL HYPERTENSION: Primary | ICD-10-CM

## 2020-03-14 LAB
ALBUMIN SERPL-MCNC: 4 G/DL (ref 3.5–5.2)
ALBUMIN/GLOB SERPL: 1.5 G/DL
ALP SERPL-CCNC: 56 U/L (ref 39–117)
ALT SERPL-CCNC: 16 U/L (ref 1–33)
AST SERPL-CCNC: 18 U/L (ref 1–32)
BASOPHILS # BLD AUTO: 0.08 10*3/MM3 (ref 0–0.2)
BASOPHILS NFR BLD AUTO: 1.7 % (ref 0–1.5)
BILIRUB SERPL-MCNC: 0.5 MG/DL (ref 0.2–1.2)
BUN SERPL-MCNC: 21 MG/DL (ref 8–23)
BUN/CREAT SERPL: 19.6 (ref 7–25)
CALCIUM SERPL-MCNC: 9.9 MG/DL (ref 8.6–10.5)
CHLORIDE SERPL-SCNC: 92 MMOL/L (ref 98–107)
CHOLEST SERPL-MCNC: 159 MG/DL (ref 0–200)
CO2 SERPL-SCNC: 30 MMOL/L (ref 22–29)
CREAT SERPL-MCNC: 1.07 MG/DL (ref 0.57–1)
EOSINOPHIL # BLD AUTO: 0.15 10*3/MM3 (ref 0–0.4)
EOSINOPHIL NFR BLD AUTO: 3.1 % (ref 0.3–6.2)
ERYTHROCYTE [DISTWIDTH] IN BLOOD BY AUTOMATED COUNT: 12.3 % (ref 12.3–15.4)
GLOBULIN SER CALC-MCNC: 2.7 GM/DL
GLUCOSE SERPL-MCNC: 97 MG/DL (ref 65–99)
HCT VFR BLD AUTO: 34.6 % (ref 34–46.6)
HDLC SERPL-MCNC: 61 MG/DL (ref 40–60)
HGB BLD-MCNC: 11.6 G/DL (ref 12–15.9)
IMM GRANULOCYTES # BLD AUTO: 0.01 10*3/MM3 (ref 0–0.05)
IMM GRANULOCYTES NFR BLD AUTO: 0.2 % (ref 0–0.5)
LDLC SERPL CALC-MCNC: 86 MG/DL (ref 0–100)
LYMPHOCYTES # BLD AUTO: 0.83 10*3/MM3 (ref 0.7–3.1)
LYMPHOCYTES NFR BLD AUTO: 17.3 % (ref 19.6–45.3)
MCH RBC QN AUTO: 30.4 PG (ref 26.6–33)
MCHC RBC AUTO-ENTMCNC: 33.5 G/DL (ref 31.5–35.7)
MCV RBC AUTO: 90.6 FL (ref 79–97)
MONOCYTES # BLD AUTO: 0.57 10*3/MM3 (ref 0.1–0.9)
MONOCYTES NFR BLD AUTO: 11.9 % (ref 5–12)
NEUTROPHILS # BLD AUTO: 3.15 10*3/MM3 (ref 1.7–7)
NEUTROPHILS NFR BLD AUTO: 65.8 % (ref 42.7–76)
NRBC BLD AUTO-RTO: 0 /100 WBC (ref 0–0.2)
PLATELET # BLD AUTO: 156 10*3/MM3 (ref 140–450)
POTASSIUM SERPL-SCNC: 4.4 MMOL/L (ref 3.5–5.2)
PROT SERPL-MCNC: 6.7 G/DL (ref 6–8.5)
RBC # BLD AUTO: 3.82 10*6/MM3 (ref 3.77–5.28)
SODIUM SERPL-SCNC: 131 MMOL/L (ref 136–145)
TRIGL SERPL-MCNC: 58 MG/DL (ref 0–150)
TSH SERPL DL<=0.005 MIU/L-ACNC: 2.34 UIU/ML (ref 0.27–4.2)
VLDLC SERPL CALC-MCNC: 11.6 MG/DL
WBC # BLD AUTO: 4.79 10*3/MM3 (ref 3.4–10.8)

## 2020-03-17 ENCOUNTER — OFFICE VISIT (OUTPATIENT)
Dept: FAMILY MEDICINE CLINIC | Facility: CLINIC | Age: 85
End: 2020-03-17

## 2020-03-17 VITALS
OXYGEN SATURATION: 98 % | HEART RATE: 108 BPM | HEIGHT: 65 IN | BODY MASS INDEX: 27.82 KG/M2 | RESPIRATION RATE: 16 BRPM | WEIGHT: 167 LBS | SYSTOLIC BLOOD PRESSURE: 122 MMHG | DIASTOLIC BLOOD PRESSURE: 88 MMHG

## 2020-03-17 DIAGNOSIS — E78.2 MIXED HYPERLIPIDEMIA: ICD-10-CM

## 2020-03-17 DIAGNOSIS — M25.512 BILATERAL SHOULDER PAIN, UNSPECIFIED CHRONICITY: ICD-10-CM

## 2020-03-17 DIAGNOSIS — I48.19 PERSISTENT ATRIAL FIBRILLATION (HCC): ICD-10-CM

## 2020-03-17 DIAGNOSIS — I10 ESSENTIAL HYPERTENSION: Primary | ICD-10-CM

## 2020-03-17 DIAGNOSIS — C50.412 MALIGNANT NEOPLASM OF UPPER-OUTER QUADRANT OF LEFT FEMALE BREAST, UNSPECIFIED ESTROGEN RECEPTOR STATUS (HCC): ICD-10-CM

## 2020-03-17 DIAGNOSIS — R79.9 ABNORMAL BLOOD CHEMISTRY: Primary | ICD-10-CM

## 2020-03-17 DIAGNOSIS — M25.511 BILATERAL SHOULDER PAIN, UNSPECIFIED CHRONICITY: ICD-10-CM

## 2020-03-17 PROCEDURE — 99214 OFFICE O/P EST MOD 30 MIN: CPT | Performed by: FAMILY MEDICINE

## 2020-03-17 NOTE — PROGRESS NOTES
Subjective   Devora Grant is a 85 y.o. female.     Chief Complaint   Patient presents with   • Follow-up     6 mo   htn        History of Present Illness     she notes good bp control without cp orha---she is noting afbi is toleratted as well as statin tx without myalgias ...she states she has had no recurrennce of breast cancer--her arhtiris stympsom are stable      Current Outpatient Medications:   •  Calcium Citrate-Vitamin D (CALCIUM + D PO), Take 500 mg by mouth daily., Disp: , Rfl:   •  carvedilol (COREG) 25 MG tablet, TAKE 1 TABLET BY MOUTH TWICE DAILY WITH MEALS, Disp: 60 tablet, Rfl: 5  •  dilTIAZem (TIAZAC) 180 MG 24 hr capsule, TAKE 1 CAPSULE BY MOUTH EVERY DAY, Disp: 90 capsule, Rfl: 1  •  dilTIAZem CD (CARDIZEM CD) 180 MG 24 hr capsule, TAKE 1 CAPSULE BY MOUTH DAILY, Disp: 90 capsule, Rfl: 1  •  ELIQUIS 5 MG tablet tablet, TAKE 1 TABLET BY MOUTH EVERY 12 HOURS, Disp: 180 tablet, Rfl: 1  •  ELIQUIS 5 MG tablet tablet, TAKE 1 TABLET BY MOUTH EVERY 12 HOURS, Disp: 180 tablet, Rfl: 0  •  hydrALAZINE (APRESOLINE) 25 MG tablet, TAKE 1 TABLET BY MOUTH THREE TIMES DAILY, Disp: 270 tablet, Rfl: 0  •  losartan (COZAAR) 100 MG tablet, TAKE 1 TABLET BY MOUTH DAILY, Disp: 90 tablet, Rfl: 0  •  losartan (COZAAR) 100 MG tablet, TAKE 1 TABLET BY MOUTH DAILY, Disp: 90 tablet, Rfl: 0  •  LOTEMAX 0.5 % ophthalmic suspension, Apply 1 drop to eye(s) as directed by provider 4 (Four) Times a Day., Disp: 3 each, Rfl: 1  •  lovastatin (MEVACOR) 10 MG tablet, TAKE 1 TABLET BY MOUTH DAILY, Disp: 90 tablet, Rfl: 0  •  potassium chloride (K-DUR,KLOR-CON) 20 MEQ CR tablet, Take 1 tablet by mouth Daily., Disp: 2 tablet, Rfl: 0  •  spironolactone (ALDACTONE) 50 MG tablet, Take 1 tablet by mouth Daily., Disp: 90 tablet, Rfl: 2  Allergies   Allergen Reactions   • Penicillins Hives   • Sulfa Antibiotics Hives   • Sulfasalazine Unknown (See Comments)       Past Medical History:   Diagnosis Date   • Atrial fibrillation (CMS/HCC)    •  "Cancer (CMS/HCC)    • Cholelithiasis    • Colon polyp    • Hyperlipidemia    • Hypertension      Past Surgical History:   Procedure Laterality Date   • APPENDECTOMY     • BREAST SURGERY     • COLONOSCOPY  10/2015    Polyps, fair prep, recall 1 year   • COLONOSCOPY      10 yr ago, Dr. Valiente   • HYSTERECTOMY     • MASTECTOMY      Left       Review of Systems   Constitutional: Negative.    HENT: Negative.    Eyes: Negative.    Respiratory: Negative.    Cardiovascular: Negative.    Gastrointestinal: Negative.    Endocrine: Negative.    Genitourinary: Negative.    Musculoskeletal: Positive for arthralgias.   Skin: Negative.    Allergic/Immunologic: Negative.    Neurological: Negative.    Hematological: Negative.    Psychiatric/Behavioral: Negative.        Objective  /88   Pulse 108   Resp 16   Ht 165.1 cm (65\")   Wt 75.8 kg (167 lb)   SpO2 98%   BMI 27.79 kg/m²   Physical Exam   Constitutional: She is oriented to person, place, and time. She appears well-developed and well-nourished.   HENT:   Head: Normocephalic and atraumatic.   Right Ear: External ear normal.   Left Ear: External ear normal.   Nose: Nose normal.   Mouth/Throat: Oropharynx is clear and moist.   Eyes: Pupils are equal, round, and reactive to light. Conjunctivae and EOM are normal.   Neck: Normal range of motion. Neck supple.   Cardiovascular: Normal rate, regular rhythm, normal heart sounds and intact distal pulses.   Pulmonary/Chest: Effort normal and breath sounds normal.   Abdominal: Soft. Bowel sounds are normal.   Musculoskeletal: Normal range of motion.   Neurological: She is alert and oriented to person, place, and time.   Skin: Skin is warm. Capillary refill takes less than 2 seconds.   Psychiatric: She has a normal mood and affect. Her behavior is normal. Judgment and thought content normal.   Nursing note and vitals reviewed.      Assessment/Plan   Devora was seen today for follow-up.    Diagnoses and all orders for this " visit:    Essential hypertension    Mixed hyperlipidemia    Persistent atrial fibrillation    Malignant neoplasm of upper-outer quadrant of left female breast, unspecified estrogen receptor status (CMS/HCC)    Bilateral shoulder pain, unspecified chronicity    Patient's Body mass index is 27.79 kg/m². BMI is within normal parameters. No follow-up required..    We dciscussed recent labs       Current outpatient and discharge medications have been reconciled for the patient.  Reviewed by: Sandoval Christian MD  No orders of the defined types were placed in this encounter.    Advance Care Planning   ACP discussion was held with the patient during this visit. Patient has an advance directive (not in EMR), copy requested.  Follow up: 6 month(s)

## 2020-03-27 RX ORDER — HYDRALAZINE HYDROCHLORIDE 25 MG/1
TABLET, FILM COATED ORAL
Qty: 270 TABLET | Refills: 0 | Status: SHIPPED | OUTPATIENT
Start: 2020-03-27 | End: 2020-06-25

## 2020-03-27 RX ORDER — CARVEDILOL 25 MG/1
TABLET ORAL
Qty: 60 TABLET | Refills: 5 | Status: SHIPPED | OUTPATIENT
Start: 2020-03-27 | End: 2021-02-08

## 2020-03-27 RX ORDER — APIXABAN 5 MG/1
TABLET, FILM COATED ORAL
Qty: 180 TABLET | Refills: 0 | Status: SHIPPED | OUTPATIENT
Start: 2020-03-27 | End: 2020-06-25

## 2020-03-27 RX ORDER — LOSARTAN POTASSIUM 100 MG/1
TABLET ORAL
Qty: 90 TABLET | Refills: 0 | Status: SHIPPED | OUTPATIENT
Start: 2020-03-27 | End: 2020-06-03 | Stop reason: SDUPTHER

## 2020-04-23 ENCOUNTER — TELEPHONE (OUTPATIENT)
Dept: FAMILY MEDICINE CLINIC | Facility: CLINIC | Age: 85
End: 2020-04-23

## 2020-04-23 NOTE — TELEPHONE ENCOUNTER
Pt states that her bp at check up the other day was 144/76.  I told her that I was going to send in Mag Citrate to [facility] and she said that she didn't know if she had anyone to pick it or prune juice or more miralax up for her.  She said that she would try to call neighbors or family to get it for her.  Also, do you want to send anything in for dizziness.

## 2020-04-23 NOTE — TELEPHONE ENCOUNTER
Devora said that she does not have anyone to bring her into the office, she doesn't want to drive herself due to the dizziness and she says that she just doesn't feel comfortable coming into the office right now.  She said that she had a neighbor get her miralax, dulcolax, and prune juice.  She says that she should be ok with that issue.  I told her to call if she decided to come in for OV or needed any thing else

## 2020-04-23 NOTE — TELEPHONE ENCOUNTER
"PT called, states she's been experiencing dizziness and constipation this week. Reports dizziness (ongoing 1 week), feels like she's going to \"fall backwards\" a lot, states she fell a few times. Constipation for the last two days, states she's tried Mirilax, but hasn't experienced any bowel movements in the past 2-3 days. PT would like PCP to call in something to combat constipation and offer an opinion on the continued dizziness.     Please advise/call Devora back: [phone].    Confirmed Pharmacy:  [id number] - [place], [place] - [address] [place] [address] AT [address] [facility] - [phone]  - [phone] FX  [address] [place] [id number]  Phone: [phone] Fax: [phone]      "

## 2020-04-23 NOTE — TELEPHONE ENCOUNTER
miralax --use as directed bid with prune juice till bm then daily for maintenance--in terms of her dizziness--make sure her bp is ok

## 2020-04-24 ENCOUNTER — OFFICE VISIT (OUTPATIENT)
Dept: FAMILY MEDICINE CLINIC | Facility: CLINIC | Age: 85
End: 2020-04-24

## 2020-04-24 VITALS — BODY MASS INDEX: 27.82 KG/M2 | OXYGEN SATURATION: 97 % | RESPIRATION RATE: 16 BRPM | HEIGHT: 65 IN | WEIGHT: 167 LBS

## 2020-04-24 DIAGNOSIS — I95.1 ORTHOSTASIS: Primary | ICD-10-CM

## 2020-04-24 PROCEDURE — 99213 OFFICE O/P EST LOW 20 MIN: CPT | Performed by: FAMILY MEDICINE

## 2020-04-24 NOTE — PROGRESS NOTES
Subjective   Devora Grant is a 85 y.o. female.     Chief Complaint   Patient presents with   • Dizziness       History of Present Illness     she notes feeling dizzy when she stands up--see orthostatic bp s----      Current Outpatient Medications:   •  Calcium Citrate-Vitamin D (CALCIUM + D PO), Take 500 mg by mouth daily., Disp: , Rfl:   •  carvedilol (COREG) 25 MG tablet, TAKE 1 TABLET BY MOUTH TWICE DAILY WITH MEALS, Disp: 60 tablet, Rfl: 5  •  dilTIAZem CD (CARDIZEM CD) 180 MG 24 hr capsule, TAKE 1 CAPSULE BY MOUTH DAILY, Disp: 90 capsule, Rfl: 1  •  ELIQUIS 5 MG tablet tablet, TAKE 1 TABLET BY MOUTH EVERY 12 HOURS, Disp: 180 tablet, Rfl: 0  •  hydrALAZINE (APRESOLINE) 25 MG tablet, TAKE 1 TABLET BY MOUTH THREE TIMES DAILY, Disp: 270 tablet, Rfl: 0  •  losartan (COZAAR) 100 MG tablet, TAKE 1 TABLET BY MOUTH DAILY, Disp: 90 tablet, Rfl: 0  •  LOTEMAX 0.5 % ophthalmic suspension, Apply 1 drop to eye(s) as directed by provider 4 (Four) Times a Day., Disp: 3 each, Rfl: 1  •  lovastatin (MEVACOR) 10 MG tablet, TAKE 1 TABLET BY MOUTH DAILY, Disp: 90 tablet, Rfl: 0  •  spironolactone (ALDACTONE) 50 MG tablet, Take 1 tablet by mouth Daily., Disp: 90 tablet, Rfl: 2  Allergies   Allergen Reactions   • Penicillins Hives   • Sulfa Antibiotics Hives   • Sulfasalazine Unknown (See Comments)       Past Medical History:   Diagnosis Date   • Atrial fibrillation (CMS/HCC)    • Cancer (CMS/HCC)    • Cholelithiasis    • Colon polyp    • Hyperlipidemia    • Hypertension      Past Surgical History:   Procedure Laterality Date   • APPENDECTOMY     • BREAST SURGERY     • COLONOSCOPY  10/2015    Polyps, fair prep, recall 1 year   • COLONOSCOPY      10 yr ago, Dr. Valiente   • HYSTERECTOMY     • MASTECTOMY      Left       Review of Systems   Constitutional: Negative.    HENT: Negative.    Eyes: Negative.    Respiratory: Negative.    Cardiovascular: Negative.    Gastrointestinal: Negative.    Endocrine: Negative.    Genitourinary: Negative.  "   Musculoskeletal: Negative.    Skin: Negative.    Allergic/Immunologic: Negative.    Neurological: Positive for dizziness.   Hematological: Negative.    Psychiatric/Behavioral: Negative.        Objective  Resp 16   Ht 165.1 cm (65\")   Wt 75.8 kg (167 lb)   SpO2 97%   BMI 27.79 kg/m²   Physical Exam   Constitutional: She is oriented to person, place, and time. She appears well-developed and well-nourished.   HENT:   Head: Normocephalic.   Eyes: Pupils are equal, round, and reactive to light. EOM are normal.   Neck: Normal range of motion. Neck supple.   Cardiovascular: Normal rate.   Pulmonary/Chest: Effort normal.   Abdominal: Soft.   Musculoskeletal: Normal range of motion.   Neurological: She is alert and oriented to person, place, and time.   Skin: Skin is warm. Capillary refill takes less than 2 seconds.   Psychiatric: She has a normal mood and affect. Her behavior is normal. Judgment and thought content normal.   Nursing note and vitals reviewed.      Assessment/Plan   Devora was seen today for dizziness.    Diagnoses and all orders for this visit:    Orthostasis      Stop spirolactone and she will let us know how she is feeling           No orders of the defined types were placed in this encounter.      Follow up: 4 week(s)  "

## 2020-05-22 ENCOUNTER — OFFICE VISIT (OUTPATIENT)
Dept: FAMILY MEDICINE CLINIC | Facility: CLINIC | Age: 85
End: 2020-05-22

## 2020-05-22 VITALS
SYSTOLIC BLOOD PRESSURE: 98 MMHG | DIASTOLIC BLOOD PRESSURE: 62 MMHG | OXYGEN SATURATION: 94 % | RESPIRATION RATE: 16 BRPM | BODY MASS INDEX: 25.66 KG/M2 | HEIGHT: 65 IN | HEART RATE: 91 BPM | WEIGHT: 154 LBS

## 2020-05-22 DIAGNOSIS — I95.1 ORTHOSTASIS: Primary | ICD-10-CM

## 2020-05-22 DIAGNOSIS — R63.4 WEIGHT LOSS: ICD-10-CM

## 2020-05-22 PROCEDURE — 99213 OFFICE O/P EST LOW 20 MIN: CPT | Performed by: FAMILY MEDICINE

## 2020-05-22 NOTE — PROGRESS NOTES
Subjective   Devora Grant is a 85 y.o. female.     Chief Complaint   Patient presents with   • Follow-up     4 week   orthostasis       History of Present Illness     she is unaware of why she is losing weight and why she is dizzy---she icannot recall which meds she takes      Current Outpatient Medications:   •  Calcium Citrate-Vitamin D (CALCIUM + D PO), Take 500 mg by mouth daily., Disp: , Rfl:   •  carvedilol (COREG) 25 MG tablet, TAKE 1 TABLET BY MOUTH TWICE DAILY WITH MEALS, Disp: 60 tablet, Rfl: 5  •  dilTIAZem CD (CARDIZEM CD) 180 MG 24 hr capsule, TAKE 1 CAPSULE BY MOUTH DAILY, Disp: 90 capsule, Rfl: 1  •  ELIQUIS 5 MG tablet tablet, TAKE 1 TABLET BY MOUTH EVERY 12 HOURS, Disp: 180 tablet, Rfl: 0  •  hydrALAZINE (APRESOLINE) 25 MG tablet, TAKE 1 TABLET BY MOUTH THREE TIMES DAILY, Disp: 270 tablet, Rfl: 0  •  losartan (COZAAR) 100 MG tablet, TAKE 1 TABLET BY MOUTH DAILY, Disp: 90 tablet, Rfl: 0  •  LOTEMAX 0.5 % ophthalmic suspension, Apply 1 drop to eye(s) as directed by provider 4 (Four) Times a Day., Disp: 3 each, Rfl: 1  •  lovastatin (MEVACOR) 10 MG tablet, TAKE 1 TABLET BY MOUTH DAILY, Disp: 90 tablet, Rfl: 0  •  spironolactone (ALDACTONE) 50 MG tablet, Take 1 tablet by mouth Daily., Disp: 90 tablet, Rfl: 2  Allergies   Allergen Reactions   • Penicillins Hives   • Sulfa Antibiotics Hives   • Sulfasalazine Unknown (See Comments)       Past Medical History:   Diagnosis Date   • Atrial fibrillation (CMS/HCC)    • Cancer (CMS/HCC)    • Cholelithiasis    • Colon polyp    • Hyperlipidemia    • Hypertension      Past Surgical History:   Procedure Laterality Date   • APPENDECTOMY     • BREAST SURGERY     • COLONOSCOPY  10/2015    Polyps, fair prep, recall 1 year   • COLONOSCOPY      10 yr ago, Dr. Valiente   • HYSTERECTOMY     • MASTECTOMY      Left       Review of Systems   Constitutional: Positive for unexpected weight change.   HENT: Negative.    Eyes: Negative.    Respiratory: Negative.    Cardiovascular:  "Negative.    Gastrointestinal: Negative.    Endocrine: Negative.    Genitourinary: Negative.    Musculoskeletal: Negative.    Skin: Negative.    Allergic/Immunologic: Negative.    Neurological: Negative.    Hematological: Negative.    Psychiatric/Behavioral: Negative.        Objective  BP 98/62   Pulse 91   Resp 16   Ht 165.1 cm (65\")   Wt 69.9 kg (154 lb)   SpO2 94%   BMI 25.63 kg/m²   Physical Exam   Constitutional: She is oriented to person, place, and time. She appears well-developed and well-nourished.   HENT:   Head: Normocephalic and atraumatic.   Eyes: Pupils are equal, round, and reactive to light. EOM are normal.   Neck: Normal range of motion. Neck supple.   Cardiovascular: Normal rate, regular rhythm, normal heart sounds and intact distal pulses.   Pulmonary/Chest: Effort normal and breath sounds normal.   Abdominal: Bowel sounds are normal.   Neurological: She is alert and oriented to person, place, and time.   Skin: Skin is warm and dry. Capillary refill takes less than 2 seconds.   Psychiatric: She has a normal mood and affect. Her behavior is normal. Judgment and thought content normal.   Nursing note and vitals reviewed.      Assessment/Plan   Devora was seen today for follow-up.    Diagnoses and all orders for this visit:    Orthostasis  -     CBC w AUTO Differential  -     Comprehensive metabolic panel  -     T4, Free  -     TSH  -     Urinalysis without microscopic (no culture) - Urine, Clean Catch  -     Protein Elec + Interp, Serum  -     CT Head Without Contrast; Future  -     CT Chest Without Contrast; Future  -     CT Abdomen Pelvis With Contrast; Future    Weight loss  -     CBC w AUTO Differential  -     Comprehensive metabolic panel  -     T4, Free  -     TSH  -     Urinalysis without microscopic (no culture) - Urine, Clean Catch  -     Protein Elec + Interp, Serum  -     CT Head Without Contrast; Future  -     CT Chest Without Contrast; Future  -     CT Abdomen Pelvis With Contrast; " Future                 Orders Placed This Encounter   Procedures   • CT Head Without Contrast     Standing Status:   Future     Standing Expiration Date:   5/22/2021     Order Specific Question:   STEREOTACTIC GUIDANCE PROTOCOL?     Answer:   No [0]     Order Specific Question:   Will fiducial markers be needed for this procedure?     Answer:   No   • CT Chest Without Contrast     Standing Status:   Future     Standing Expiration Date:   5/22/2021   • CT Abdomen Pelvis With Contrast     Standing Status:   Future     Standing Expiration Date:   5/22/2021     Order Specific Question:   Will Oral Contrast be needed for this procedure?     Answer:   No   • Comprehensive metabolic panel   • T4, Free   • TSH   • Urinalysis without microscopic (no culture) - Urine, Clean Catch   • Protein Elec + Interp, Serum   • CBC w AUTO Differential     Order Specific Question:   Manual Differential     Answer:   No       Follow up: 2 week(s)

## 2020-05-26 DIAGNOSIS — I95.1 ORTHOSTASIS: ICD-10-CM

## 2020-05-26 DIAGNOSIS — R63.4 WEIGHT LOSS: ICD-10-CM

## 2020-05-26 LAB
ALBUMIN SERPL ELPH-MCNC: 3.4 G/DL (ref 2.9–4.4)
ALBUMIN SERPL-MCNC: 4.1 G/DL (ref 3.5–5.2)
ALBUMIN/GLOB SERPL: 1.1 {RATIO} (ref 0.7–1.7)
ALBUMIN/GLOB SERPL: 1.7 G/DL
ALP SERPL-CCNC: 62 U/L (ref 39–117)
ALPHA1 GLOB SERPL ELPH-MCNC: 0.3 G/DL (ref 0–0.4)
ALPHA2 GLOB SERPL ELPH-MCNC: 0.9 G/DL (ref 0.4–1)
ALT SERPL-CCNC: 19 U/L (ref 1–33)
APPEARANCE UR: CLEAR
AST SERPL-CCNC: 23 U/L (ref 1–32)
B-GLOBULIN SERPL ELPH-MCNC: 1 G/DL (ref 0.7–1.3)
BASOPHILS # BLD AUTO: 0.08 10*3/MM3 (ref 0–0.2)
BASOPHILS NFR BLD AUTO: 1.4 % (ref 0–1.5)
BILIRUB SERPL-MCNC: 0.5 MG/DL (ref 0.2–1.2)
BILIRUB UR QL STRIP: NEGATIVE
BUN SERPL-MCNC: 17 MG/DL (ref 8–23)
BUN/CREAT SERPL: 15.9 (ref 7–25)
CALCIUM SERPL-MCNC: 9.6 MG/DL (ref 8.6–10.5)
CHLORIDE SERPL-SCNC: 91 MMOL/L (ref 98–107)
CO2 SERPL-SCNC: 25.7 MMOL/L (ref 22–29)
COLOR UR: YELLOW
CREAT SERPL-MCNC: 1.07 MG/DL (ref 0.57–1)
EOSINOPHIL # BLD AUTO: 0.09 10*3/MM3 (ref 0–0.4)
EOSINOPHIL NFR BLD AUTO: 1.5 % (ref 0.3–6.2)
ERYTHROCYTE [DISTWIDTH] IN BLOOD BY AUTOMATED COUNT: 12.9 % (ref 12.3–15.4)
GAMMA GLOB SERPL ELPH-MCNC: 0.9 G/DL (ref 0.4–1.8)
GLOBULIN SER CALC-MCNC: 2.4 GM/DL
GLOBULIN SER CALC-MCNC: 3.1 G/DL (ref 2.2–3.9)
GLUCOSE SERPL-MCNC: 98 MG/DL (ref 65–99)
GLUCOSE UR QL: NEGATIVE
HCT VFR BLD AUTO: 31.3 % (ref 34–46.6)
HGB BLD-MCNC: 10.6 G/DL (ref 12–15.9)
HGB UR QL STRIP: NEGATIVE
IMM GRANULOCYTES # BLD AUTO: 0.02 10*3/MM3 (ref 0–0.05)
IMM GRANULOCYTES NFR BLD AUTO: 0.3 % (ref 0–0.5)
KETONES UR QL STRIP: NEGATIVE
LABORATORY COMMENT REPORT: NORMAL
LEUKOCYTE ESTERASE UR QL STRIP: ABNORMAL
LYMPHOCYTES # BLD AUTO: 0.86 10*3/MM3 (ref 0.7–3.1)
LYMPHOCYTES NFR BLD AUTO: 14.6 % (ref 19.6–45.3)
M PROTEIN SERPL ELPH-MCNC: NORMAL G/DL
MCH RBC QN AUTO: 30.8 PG (ref 26.6–33)
MCHC RBC AUTO-ENTMCNC: 33.9 G/DL (ref 31.5–35.7)
MCV RBC AUTO: 91 FL (ref 79–97)
MONOCYTES # BLD AUTO: 0.59 10*3/MM3 (ref 0.1–0.9)
MONOCYTES NFR BLD AUTO: 10 % (ref 5–12)
NEUTROPHILS # BLD AUTO: 4.25 10*3/MM3 (ref 1.7–7)
NEUTROPHILS NFR BLD AUTO: 72.2 % (ref 42.7–76)
NITRITE UR QL STRIP: NEGATIVE
NRBC BLD AUTO-RTO: 0 /100 WBC (ref 0–0.2)
PH UR STRIP: 6 [PH] (ref 5–8)
PLATELET # BLD AUTO: 180 10*3/MM3 (ref 140–450)
POTASSIUM SERPL-SCNC: 4.8 MMOL/L (ref 3.5–5.2)
PROT PATTERN SERPL ELPH-IMP: NORMAL
PROT SERPL-MCNC: 6.5 G/DL (ref 6–8.5)
PROT UR QL STRIP: NEGATIVE
RBC # BLD AUTO: 3.44 10*6/MM3 (ref 3.77–5.28)
SODIUM SERPL-SCNC: 128 MMOL/L (ref 136–145)
SP GR UR: 1.01 (ref 1–1.03)
T4 FREE SERPL-MCNC: 1.51 NG/DL (ref 0.93–1.7)
TSH SERPL DL<=0.005 MIU/L-ACNC: 1.62 UIU/ML (ref 0.27–4.2)
UROBILINOGEN UR STRIP-MCNC: ABNORMAL MG/DL
WBC # BLD AUTO: 5.89 10*3/MM3 (ref 3.4–10.8)

## 2020-05-27 ENCOUNTER — TELEPHONE (OUTPATIENT)
Dept: FAMILY MEDICINE CLINIC | Facility: CLINIC | Age: 85
End: 2020-05-27

## 2020-06-03 RX ORDER — LOSARTAN POTASSIUM 100 MG/1
100 TABLET ORAL DAILY
Qty: 90 TABLET | Refills: 1 | Status: SHIPPED | OUTPATIENT
Start: 2020-06-03 | End: 2020-08-04 | Stop reason: SDUPTHER

## 2020-06-25 RX ORDER — LOVASTATIN 10 MG/1
TABLET ORAL
Qty: 90 TABLET | Refills: 0 | Status: SHIPPED | OUTPATIENT
Start: 2020-06-25 | End: 2020-10-30 | Stop reason: SDUPTHER

## 2020-06-25 RX ORDER — HYDRALAZINE HYDROCHLORIDE 25 MG/1
TABLET, FILM COATED ORAL
Qty: 270 TABLET | Refills: 0 | Status: SHIPPED | OUTPATIENT
Start: 2020-06-25 | End: 2020-11-13 | Stop reason: SDUPTHER

## 2020-06-25 RX ORDER — DILTIAZEM HYDROCHLORIDE 180 MG/1
CAPSULE, EXTENDED RELEASE ORAL
Qty: 90 CAPSULE | Refills: 1 | Status: SHIPPED | OUTPATIENT
Start: 2020-06-25 | End: 2021-03-10

## 2020-06-25 RX ORDER — APIXABAN 5 MG/1
TABLET, FILM COATED ORAL
Qty: 180 TABLET | Refills: 0 | Status: SHIPPED | OUTPATIENT
Start: 2020-06-25 | End: 2020-09-21 | Stop reason: SDUPTHER

## 2020-06-25 RX ORDER — SPIRONOLACTONE 50 MG/1
50 TABLET, FILM COATED ORAL DAILY
Qty: 90 TABLET | Refills: 2 | Status: SHIPPED | OUTPATIENT
Start: 2020-06-25 | End: 2021-11-05

## 2020-08-04 ENCOUNTER — TELEPHONE (OUTPATIENT)
Dept: FAMILY MEDICINE CLINIC | Facility: CLINIC | Age: 85
End: 2020-08-04

## 2020-08-04 RX ORDER — LOSARTAN POTASSIUM 100 MG/1
100 TABLET ORAL DAILY
Qty: 90 TABLET | Refills: 1 | Status: SHIPPED | OUTPATIENT
Start: 2020-08-04 | End: 2020-09-16 | Stop reason: ALTCHOICE

## 2020-08-04 NOTE — TELEPHONE ENCOUNTER
PT called in reference to the following medication: losartan (COZAAR) 100 MG tablet. States she's almost completely out of the medication and needs a refill ASAP, but she'd prefer to try a different medication. Reports shoulder and arm pain associated with Losartan and feels that if she continues to take it she'll completely lose control of her arms.     Please advise/call Devora back: [phone]    Confirmed Pharmacy:   [facility] #[id number] - [place], [place] - [address] [place] [address] AT [address] - [phone]  - [phone] FX  [address] [place] [id number]  Phone: [phone] Fax: [phone]

## 2020-08-05 RX ORDER — VALSARTAN 320 MG/1
320 TABLET ORAL DAILY
Qty: 30 TABLET | Refills: 5 | Status: SHIPPED | OUTPATIENT
Start: 2020-08-05

## 2020-09-11 DIAGNOSIS — I10 ESSENTIAL HYPERTENSION: Primary | ICD-10-CM

## 2020-09-11 DIAGNOSIS — E78.5 HYPERLIPIDEMIA, UNSPECIFIED HYPERLIPIDEMIA TYPE: ICD-10-CM

## 2020-09-11 DIAGNOSIS — E78.2 MIXED HYPERLIPIDEMIA: ICD-10-CM

## 2020-09-14 ENCOUNTER — TELEPHONE (OUTPATIENT)
Dept: FAMILY MEDICINE CLINIC | Facility: CLINIC | Age: 85
End: 2020-09-14

## 2020-09-14 ENCOUNTER — LAB (OUTPATIENT)
Dept: FAMILY MEDICINE CLINIC | Facility: CLINIC | Age: 85
End: 2020-09-14

## 2020-09-14 NOTE — TELEPHONE ENCOUNTER
PATIENT STATES THE NURSE ASKED HER TO COME BACK IN AT 1 PM TODAY FOR HER LABS   SHE STATES SHE WILL WAIT UNTIL TOMORROW MORNING       REQUESTING A CALL BACK   GOOD CONTACT NUMBER   [phone] (L)

## 2020-09-15 ENCOUNTER — LAB (OUTPATIENT)
Dept: FAMILY MEDICINE CLINIC | Facility: CLINIC | Age: 85
End: 2020-09-15

## 2020-09-16 ENCOUNTER — OFFICE VISIT (OUTPATIENT)
Dept: FAMILY MEDICINE CLINIC | Facility: CLINIC | Age: 85
End: 2020-09-16

## 2020-09-16 ENCOUNTER — TELEPHONE (OUTPATIENT)
Dept: FAMILY MEDICINE CLINIC | Facility: CLINIC | Age: 85
End: 2020-09-16

## 2020-09-16 VITALS
HEIGHT: 65 IN | WEIGHT: 153 LBS | DIASTOLIC BLOOD PRESSURE: 72 MMHG | HEART RATE: 50 BPM | RESPIRATION RATE: 16 BRPM | SYSTOLIC BLOOD PRESSURE: 102 MMHG | OXYGEN SATURATION: 97 % | BODY MASS INDEX: 25.49 KG/M2

## 2020-09-16 DIAGNOSIS — I10 ESSENTIAL HYPERTENSION: Primary | ICD-10-CM

## 2020-09-16 DIAGNOSIS — I48.19 PERSISTENT ATRIAL FIBRILLATION (HCC): ICD-10-CM

## 2020-09-16 DIAGNOSIS — E78.2 MIXED HYPERLIPIDEMIA: ICD-10-CM

## 2020-09-16 LAB
ALBUMIN SERPL-MCNC: 4.1 G/DL (ref 3.5–5.2)
ALBUMIN/GLOB SERPL: 2.1 G/DL
ALP SERPL-CCNC: 70 U/L (ref 39–117)
ALT SERPL-CCNC: 16 U/L (ref 1–33)
AST SERPL-CCNC: 22 U/L (ref 1–32)
BASOPHILS # BLD AUTO: 0.04 10*3/MM3 (ref 0–0.2)
BASOPHILS NFR BLD AUTO: 0.9 % (ref 0–1.5)
BILIRUB SERPL-MCNC: 0.4 MG/DL (ref 0–1.2)
BUN SERPL-MCNC: 11 MG/DL (ref 8–23)
BUN/CREAT SERPL: 14.1 (ref 7–25)
CALCIUM SERPL-MCNC: 9.6 MG/DL (ref 8.6–10.5)
CHLORIDE SERPL-SCNC: 86 MMOL/L (ref 98–107)
CHOLEST SERPL-MCNC: 160 MG/DL (ref 0–200)
CO2 SERPL-SCNC: 26.2 MMOL/L (ref 22–29)
CREAT SERPL-MCNC: 0.78 MG/DL (ref 0.57–1)
EOSINOPHIL # BLD AUTO: 0.13 10*3/MM3 (ref 0–0.4)
EOSINOPHIL NFR BLD AUTO: 2.9 % (ref 0.3–6.2)
ERYTHROCYTE [DISTWIDTH] IN BLOOD BY AUTOMATED COUNT: 12.4 % (ref 12.3–15.4)
GLOBULIN SER CALC-MCNC: 2 GM/DL
GLUCOSE SERPL-MCNC: 101 MG/DL (ref 65–99)
HCT VFR BLD AUTO: 28.3 % (ref 34–46.6)
HDLC SERPL-MCNC: 66 MG/DL (ref 40–60)
HGB BLD-MCNC: 9.8 G/DL (ref 12–15.9)
IMM GRANULOCYTES # BLD AUTO: 0.01 10*3/MM3 (ref 0–0.05)
IMM GRANULOCYTES NFR BLD AUTO: 0.2 % (ref 0–0.5)
LDLC SERPL CALC-MCNC: 83 MG/DL (ref 0–100)
LYMPHOCYTES # BLD AUTO: 0.67 10*3/MM3 (ref 0.7–3.1)
LYMPHOCYTES NFR BLD AUTO: 14.9 % (ref 19.6–45.3)
MCH RBC QN AUTO: 31.7 PG (ref 26.6–33)
MCHC RBC AUTO-ENTMCNC: 34.6 G/DL (ref 31.5–35.7)
MCV RBC AUTO: 91.6 FL (ref 79–97)
MONOCYTES # BLD AUTO: 0.49 10*3/MM3 (ref 0.1–0.9)
MONOCYTES NFR BLD AUTO: 10.9 % (ref 5–12)
NEUTROPHILS # BLD AUTO: 3.17 10*3/MM3 (ref 1.7–7)
NEUTROPHILS NFR BLD AUTO: 70.2 % (ref 42.7–76)
NRBC BLD AUTO-RTO: 0 /100 WBC (ref 0–0.2)
PLATELET # BLD AUTO: 158 10*3/MM3 (ref 140–450)
POTASSIUM SERPL-SCNC: 5 MMOL/L (ref 3.5–5.2)
PROT SERPL-MCNC: 6.1 G/DL (ref 6–8.5)
RBC # BLD AUTO: 3.09 10*6/MM3 (ref 3.77–5.28)
SODIUM SERPL-SCNC: 121 MMOL/L (ref 136–145)
TRIGL SERPL-MCNC: 54 MG/DL (ref 0–150)
TSH SERPL DL<=0.005 MIU/L-ACNC: 2.26 UIU/ML (ref 0.27–4.2)
VLDLC SERPL CALC-MCNC: 10.8 MG/DL
WBC # BLD AUTO: 4.51 10*3/MM3 (ref 3.4–10.8)

## 2020-09-16 PROCEDURE — G0439 PPPS, SUBSEQ VISIT: HCPCS | Performed by: FAMILY MEDICINE

## 2020-09-16 PROCEDURE — 99213 OFFICE O/P EST LOW 20 MIN: CPT | Performed by: FAMILY MEDICINE

## 2020-09-16 NOTE — TELEPHONE ENCOUNTER
I spoke to the pharmacy on Devora's medications.  Everything on med list is correct.  She is not taking losartan, but is taking valsartan, and has not picked up her lovastatin since March so not sure if she is taking this as prescribed.

## 2020-09-16 NOTE — PATIENT INSTRUCTIONS

## 2020-09-16 NOTE — PROGRESS NOTES
Subjective   Devora Grant is a 86 y.o. female.     Chief Complaint   Patient presents with   • Follow-up     6 mo    htn   • Medicare Wellness-subsequent        History of Present Illness     she thinks her bp is stable and denies any cp or ha---she is tolerating statin without mylagia s      Current Outpatient Medications:   •  Calcium Citrate-Vitamin D (CALCIUM + D PO), Take 500 mg by mouth daily., Disp: , Rfl:   •  spironolactone (ALDACTONE) 50 MG tablet, TAKE 1 TABLET BY MOUTH DAILY, Disp: 90 tablet, Rfl: 2  •  carvedilol (COREG) 25 MG tablet, TAKE 1 TABLET BY MOUTH TWICE DAILY WITH MEALS, Disp: 60 tablet, Rfl: 5  •  dilTIAZem (TIAZAC) 180 MG 24 hr capsule, TAKE 1 CAPSULE BY MOUTH EVERY DAY, Disp: 90 capsule, Rfl: 1  •  dilTIAZem CD (CARDIZEM CD) 180 MG 24 hr capsule, TAKE 1 CAPSULE BY MOUTH DAILY, Disp: 90 capsule, Rfl: 1  •  ELIQUIS 5 MG tablet tablet, TAKE 1 TABLET BY MOUTH EVERY 12 HOURS, Disp: 180 tablet, Rfl: 0  •  hydrALAZINE (APRESOLINE) 25 MG tablet, TAKE 1 TABLET BY MOUTH THREE TIMES DAILY, Disp: 270 tablet, Rfl: 0  •  losartan (COZAAR) 100 MG tablet, Take 1 tablet by mouth Daily., Disp: 90 tablet, Rfl: 1  •  LOTEMAX 0.5 % ophthalmic suspension, Apply 1 drop to eye(s) as directed by provider 4 (Four) Times a Day., Disp: 3 each, Rfl: 1  •  lovastatin (MEVACOR) 10 MG tablet, TAKE 1 TABLET BY MOUTH DAILY, Disp: 90 tablet, Rfl: 0  •  valsartan (Diovan) 320 MG tablet, Take 1 tablet by mouth Daily., Disp: 30 tablet, Rfl: 5  Allergies   Allergen Reactions   • Penicillins Hives   • Sulfa Antibiotics Hives   • Sulfasalazine Unknown (See Comments)       Past Medical History:   Diagnosis Date   • Atrial fibrillation (CMS/HCC)    • Cancer (CMS/HCC)    • Cholelithiasis    • Colon polyp    • Hyperlipidemia    • Hypertension      Past Surgical History:   Procedure Laterality Date   • APPENDECTOMY     • BREAST SURGERY     • COLONOSCOPY  10/2015    Polyps, fair prep, recall 1 year   • COLONOSCOPY      10 yr ago,   "Steffanie   • HYSTERECTOMY     • MASTECTOMY      Left       Review of Systems   Constitutional: Negative.    HENT: Negative.    Eyes: Negative.    Respiratory: Negative.    Cardiovascular: Negative.    Gastrointestinal: Negative.    Endocrine: Negative.    Genitourinary: Negative.    Musculoskeletal: Negative.    Skin: Negative.    Allergic/Immunologic: Negative.    Neurological: Negative.    Hematological: Negative.    Psychiatric/Behavioral: Negative.        Objective  /72   Pulse 50   Resp 16   Ht 165.1 cm (65\")   Wt 69.4 kg (153 lb)   SpO2 97%   BMI 25.46 kg/m²   Physical Exam  Vitals signs and nursing note reviewed.   Constitutional:       Appearance: Normal appearance. She is normal weight.   HENT:      Head: Normocephalic.      Right Ear: Tympanic membrane, ear canal and external ear normal.      Left Ear: Tympanic membrane, ear canal and external ear normal.      Nose: Nose normal.      Mouth/Throat:      Mouth: Mucous membranes are dry.      Pharynx: Oropharynx is clear.   Eyes:      Extraocular Movements: Extraocular movements intact.      Conjunctiva/sclera: Conjunctivae normal.      Pupils: Pupils are equal, round, and reactive to light.   Neck:      Musculoskeletal: Normal range of motion and neck supple.   Cardiovascular:      Rate and Rhythm: Normal rate and regular rhythm.      Heart sounds: Normal heart sounds.   Pulmonary:      Effort: Pulmonary effort is normal.      Breath sounds: Normal breath sounds.   Abdominal:      General: Abdomen is flat. Bowel sounds are normal.      Palpations: Abdomen is soft.   Musculoskeletal: Normal range of motion.   Skin:     General: Skin is warm and dry.      Capillary Refill: Capillary refill takes less than 2 seconds.   Neurological:      General: No focal deficit present.      Mental Status: She is alert and oriented to person, place, and time. Mental status is at baseline.   Psychiatric:         Mood and Affect: Mood normal.         Behavior: Behavior " normal.         Thought Content: Thought content normal.         Judgment: Judgment normal.         Assessment/Plan   Devora was seen today for follow-up and medicare wellness-subsequent.    Diagnoses and all orders for this visit:    Essential hypertension    Mixed hyperlipidemia    Persistent atrial fibrillation (CMS/HCC)    im not certain the meds she is taking is listed               No orders of the defined types were placed in this encounter.      Follow up: 4 week(s)

## 2020-09-16 NOTE — PROGRESS NOTES
The ABCs of the Annual Wellness Visit  Subsequent Medicare Wellness Visit    Chief Complaint   Patient presents with   • Follow-up     6 mo    htn   • Medicare Wellness-subsequent       Subjective   History of Present Illness:  Devora Grant is a 86 y.o. female who presents for a Subsequent Medicare Wellness Visit.    HEALTH RISK ASSESSMENT    Recent Hospitalizations:  No hospitalization(s) within the last year.    Current Medical Providers:  Patient Care Team:  Sandoval Christian MD as PCP - General (Family Medicine)  Sandoval Christian MD as PCP - Claims Attributed  Kong Tse MD as Cardiologist (Cardiology)    Smoking Status:  Social History     Tobacco Use   Smoking Status Never Smoker       Alcohol Consumption:  Social History     Substance and Sexual Activity   Alcohol Use No       Depression Screen:   PHQ-2/PHQ-9 Depression Screening 9/16/2020   Little interest or pleasure in doing things 0   Feeling down, depressed, or hopeless 0   Trouble falling or staying asleep, or sleeping too much 1   Feeling tired or having little energy 1   Poor appetite or overeating 0   Feeling bad about yourself - or that you are a failure or have let yourself or your family down 0   Trouble concentrating on things, such as reading the newspaper or watching television 0   Moving or speaking so slowly that other people could have noticed. Or the opposite - being so fidgety or restless that you have been moving around a lot more than usual 0   Thoughts that you would be better off dead, or of hurting yourself in some way 0   Total Score 2   If you checked off any problems, how difficult have these problems made it for you to do your work, take care of things at home, or get along with other people? Not difficult at all       Fall Risk Screen:  STEADI Fall Risk Assessment was completed, and patient is at MODERATE risk for falls. Assessment completed on:9/16/2020    Health Habits and Functional and Cognitive  Screening:  Functional & Cognitive Status 9/16/2020   Do you have difficulty preparing food and eating? No   Do you have difficulty bathing yourself, getting dressed or grooming yourself? No   Do you have difficulty using the toilet? No   Do you have difficulty moving around from place to place? No   Do you have trouble with steps or getting out of a bed or a chair? No   Current Diet Well Balanced Diet   Dental Exam Up to date   Eye Exam Up to date   Exercise (times per week) 3 times per week   Current Exercise Activities Include Walking   Do you need help using the phone?  No   Are you deaf or do you have serious difficulty hearing?  No   Do you need help with transportation? No   Do you need help shopping? No   Do you need help preparing meals?  No   Do you need help with housework?  No   Do you need help with laundry? No   Do you need help taking your medications? No   Do you need help managing money? No   Do you ever drive or ride in a car without wearing a seat belt? No   Have you felt unusual stress, anger or loneliness in the last month? Yes   Who do you live with? Alone   If you need help, do you have trouble finding someone available to you? No   Have you been bothered in the last four weeks by sexual problems? No   Do you have difficulty concentrating, remembering or making decisions? No         Does the patient have evidence of cognitive impairment? No    Asprin use counseling:Does not need ASA (and currently is not on it)    Age-appropriate Screening Schedule:  Refer to the list below for future screening recommendations based on patient's age, sex and/or medical conditions. Orders for these recommended tests are listed in the plan section. The patient has been provided with a written plan.    Health Maintenance   Topic Date Due   • MAMMOGRAM  1934   • TDAP/TD VACCINES (1 - Tdap) 06/13/1953   • ZOSTER VACCINE (1 of 2) 06/13/1984   • INFLUENZA VACCINE  08/01/2020   • LIPID PANEL  03/13/2021           The following portions of the patient's history were reviewed and updated as appropriate: allergies, current medications, past family history, past medical history, past social history, past surgical history and problem list.    Outpatient Medications Prior to Visit   Medication Sig Dispense Refill   • Calcium Citrate-Vitamin D (CALCIUM + D PO) Take 500 mg by mouth daily.     • spironolactone (ALDACTONE) 50 MG tablet TAKE 1 TABLET BY MOUTH DAILY 90 tablet 2   • carvedilol (COREG) 25 MG tablet TAKE 1 TABLET BY MOUTH TWICE DAILY WITH MEALS 60 tablet 5   • dilTIAZem (TIAZAC) 180 MG 24 hr capsule TAKE 1 CAPSULE BY MOUTH EVERY DAY 90 capsule 1   • dilTIAZem CD (CARDIZEM CD) 180 MG 24 hr capsule TAKE 1 CAPSULE BY MOUTH DAILY 90 capsule 1   • ELIQUIS 5 MG tablet tablet TAKE 1 TABLET BY MOUTH EVERY 12 HOURS 180 tablet 0   • hydrALAZINE (APRESOLINE) 25 MG tablet TAKE 1 TABLET BY MOUTH THREE TIMES DAILY 270 tablet 0   • losartan (COZAAR) 100 MG tablet Take 1 tablet by mouth Daily. 90 tablet 1   • LOTEMAX 0.5 % ophthalmic suspension Apply 1 drop to eye(s) as directed by provider 4 (Four) Times a Day. 3 each 1   • lovastatin (MEVACOR) 10 MG tablet TAKE 1 TABLET BY MOUTH DAILY 90 tablet 0   • valsartan (Diovan) 320 MG tablet Take 1 tablet by mouth Daily. 30 tablet 5     No facility-administered medications prior to visit.        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 Patient has an advance directive (not in EMR), copy requested.    Review of Systems    Compared to one year ago, the patient feels her physical health is the same.  Compared to one year  "ago, the patient feels her mental health is the same.    Reviewed chart for potential of high risk medication in the elderly: yes  Reviewed chart for potential of harmful drug interactions in the elderly:yes    Objective         Vitals:    09/16/20 0811   BP: 102/72   Pulse: 50   Resp: 16   SpO2: 97%   Weight: 69.4 kg (153 lb)   Height: 165.1 cm (65\")       Body mass index is 25.46 kg/m².  Discussed the patient's BMI with her. The BMI is in the acceptable range.    Physical Exam    Lab Results   Component Value Date     (H) 09/15/2020    CHLPL 160 09/15/2020    TRIG 54 09/15/2020    HDL 66 (H) 09/15/2020    LDL 83 09/15/2020    VLDL 10.8 09/15/2020        Assessment/Plan   Medicare Risks and Personalized Health Plan  CMS Preventative Services Quick Reference  Cardiovascular risk    The above risks/problems have been discussed with the patient.  Pertinent information has been shared with the patient in the After Visit Summary.  Follow up plans and orders are seen below in the Assessment/Plan Section.    Diagnoses and all orders for this visit:    1. Essential hypertension (Primary)    2. Mixed hyperlipidemia    3. Persistent atrial fibrillation (CMS/[facility])      Follow Up:  Return in about 4 weeks (around 10/14/2020).     An After Visit Summary and PPPS were given to the patient.             "

## 2020-09-17 NOTE — TELEPHONE ENCOUNTER
Can we PLEASE GET A COPY OF THIS MEDS SHE IS CURRENTLY TAKING?--I AM SORRY I THOUGHT THIS WAS DONE YESTERDAY

## 2020-09-17 NOTE — TELEPHONE ENCOUNTER
tod----let her know I think the aldactone may be  Causing her low sdium---I want her to stop it----get repreat bmp in 3 weeks--lets schedule [id number]

## 2020-09-17 NOTE — TELEPHONE ENCOUNTER
I went over all her meds with the bottles she has at home and she is taking diovan 320,tiazac 180mg,eliquis qd,apresoline 25mg tid,mevacor 10mg coreg 25mg daily this is all she is taking.

## 2020-10-12 ENCOUNTER — LAB (OUTPATIENT)
Dept: FAMILY MEDICINE CLINIC | Facility: CLINIC | Age: 85
End: 2020-10-12

## 2020-10-12 DIAGNOSIS — R79.89 LOW SERUM SODIUM: Primary | ICD-10-CM

## 2020-10-13 LAB
BUN SERPL-MCNC: 14 MG/DL (ref 8–23)
BUN/CREAT SERPL: 17.1 (ref 7–25)
CALCIUM SERPL-MCNC: 9.8 MG/DL (ref 8.6–10.5)
CHLORIDE SERPL-SCNC: 84 MMOL/L (ref 98–107)
CO2 SERPL-SCNC: 25.4 MMOL/L (ref 22–29)
CREAT SERPL-MCNC: 0.82 MG/DL (ref 0.57–1)
GLUCOSE SERPL-MCNC: 101 MG/DL (ref 65–99)
POTASSIUM SERPL-SCNC: 5.1 MMOL/L (ref 3.5–5.2)
SODIUM SERPL-SCNC: 120 MMOL/L (ref 136–145)

## 2020-10-14 ENCOUNTER — OFFICE VISIT (OUTPATIENT)
Dept: FAMILY MEDICINE CLINIC | Facility: CLINIC | Age: 85
End: 2020-10-14

## 2020-10-14 VITALS
SYSTOLIC BLOOD PRESSURE: 98 MMHG | OXYGEN SATURATION: 99 % | BODY MASS INDEX: 24.32 KG/M2 | RESPIRATION RATE: 16 BRPM | HEART RATE: 73 BPM | HEIGHT: 65 IN | DIASTOLIC BLOOD PRESSURE: 64 MMHG | WEIGHT: 146 LBS

## 2020-10-14 DIAGNOSIS — E87.1 HYPONATREMIA: ICD-10-CM

## 2020-10-14 DIAGNOSIS — S09.90XA CLOSED HEAD INJURY, INITIAL ENCOUNTER: Primary | ICD-10-CM

## 2020-10-14 DIAGNOSIS — S09.90XA CLOSED HEAD INJURY, INITIAL ENCOUNTER: ICD-10-CM

## 2020-10-14 PROCEDURE — 99213 OFFICE O/P EST LOW 20 MIN: CPT | Performed by: FAMILY MEDICINE

## 2020-10-14 NOTE — PATIENT INSTRUCTIONS

## 2020-10-14 NOTE — PROGRESS NOTES
Subjective   Devora Grant is a 86 y.o. female.     Chief Complaint   Patient presents with   • Follow-up     4 week    htn   the patient would like to discuss lowering or d/c losartan.       History of Present Illness     we note her bp ---i told her to stop her bp meds today--she has fallen and has a large hematoma on the foreheard      Current Outpatient Medications:   •  apixaban (Eliquis) 5 MG tablet tablet, Take 1 tablet by mouth Every 12 (Twelve) Hours., Disp: 180 tablet, Rfl: 1  •  Calcium Citrate-Vitamin D (CALCIUM + D PO), Take 500 mg by mouth daily., Disp: , Rfl:   •  carvedilol (COREG) 25 MG tablet, TAKE 1 TABLET BY MOUTH TWICE DAILY WITH MEALS, Disp: 60 tablet, Rfl: 5  •  dilTIAZem (TIAZAC) 180 MG 24 hr capsule, TAKE 1 CAPSULE BY MOUTH EVERY DAY, Disp: 90 capsule, Rfl: 1  •  dilTIAZem CD (CARDIZEM CD) 180 MG 24 hr capsule, TAKE 1 CAPSULE BY MOUTH DAILY, Disp: 90 capsule, Rfl: 1  •  hydrALAZINE (APRESOLINE) 25 MG tablet, TAKE 1 TABLET BY MOUTH THREE TIMES DAILY, Disp: 270 tablet, Rfl: 0  •  LOTEMAX 0.5 % ophthalmic suspension, Apply 1 drop to eye(s) as directed by provider 4 (Four) Times a Day., Disp: 3 each, Rfl: 1  •  lovastatin (MEVACOR) 10 MG tablet, TAKE 1 TABLET BY MOUTH DAILY, Disp: 90 tablet, Rfl: 0  •  spironolactone (ALDACTONE) 50 MG tablet, TAKE 1 TABLET BY MOUTH DAILY, Disp: 90 tablet, Rfl: 2  •  valsartan (Diovan) 320 MG tablet, Take 1 tablet by mouth Daily., Disp: 30 tablet, Rfl: 5  Allergies   Allergen Reactions   • Penicillins Hives   • Sulfa Antibiotics Hives   • Sulfasalazine Unknown (See Comments)       Past Medical History:   Diagnosis Date   • Atrial fibrillation (CMS/HCC)    • Cancer (CMS/HCC)    • Cholelithiasis    • Colon polyp    • Hyperlipidemia    • Hypertension      Past Surgical History:   Procedure Laterality Date   • APPENDECTOMY     • BREAST SURGERY     • COLONOSCOPY  10/2015    Polyps, fair prep, recall 1 year   • COLONOSCOPY      10 yr ago, Dr. Valiente   • HYSTERECTOMY    "  • MASTECTOMY      Left       Review of Systems   Constitutional: Negative.    HENT: Negative.    Eyes: Negative.    Respiratory: Negative.    Cardiovascular: Negative.    Gastrointestinal: Negative.    Endocrine: Negative.    Genitourinary: Negative.    Musculoskeletal: Negative.    Skin: Positive for wound.   Allergic/Immunologic: Negative.    Neurological: Negative.    Hematological: Negative.    Psychiatric/Behavioral: Negative.        Objective  BP 98/64   Pulse 73   Resp 16   Ht 165.1 cm (65\")   Wt 66.2 kg (146 lb)   SpO2 99%   BMI 24.30 kg/m²   Physical Exam  Vitals signs and nursing note reviewed.   Constitutional:       Appearance: Normal appearance.   HENT:      Head: Normocephalic and atraumatic.      Nose: Nose normal.      Mouth/Throat:      Mouth: Mucous membranes are moist.   Eyes:      Extraocular Movements: Extraocular movements intact.      Pupils: Pupils are equal, round, and reactive to light.   Neck:      Musculoskeletal: Normal range of motion.   Cardiovascular:      Rate and Rhythm: Normal rate.      Pulses: Normal pulses.   Abdominal:      General: Abdomen is flat. Bowel sounds are normal.      Palpations: Abdomen is soft.   Musculoskeletal: Normal range of motion.   Skin:     Capillary Refill: Capillary refill takes less than 2 seconds.      Findings: Bruising present.   Neurological:      General: No focal deficit present.      Mental Status: She is alert and oriented to person, place, and time. Mental status is at baseline.   Psychiatric:         Mood and Affect: Mood normal.         Behavior: Behavior normal.         Thought Content: Thought content normal.         Judgment: Judgment normal.         Assessment/Plan   Diagnoses and all orders for this visit:    1. Closed head injury, initial encounter (Primary)  -     CT Head Without Contrast; Future    2. Hyponatremia  -     Ambulatory Referral to Nephrology                 Orders Placed This Encounter   Procedures   • CT Head " Without Contrast     Standing Status:   Future     Standing Expiration Date:   10/14/2021     Order Specific Question:   STEREOTACTIC GUIDANCE PROTOCOL?     Answer:   No [0]     Order Specific Question:   Will fiducial markers be needed for this procedure?     Answer:   No   • Ambulatory Referral to Nephrology     Referral Priority:   Urgent     Referral Type:   Consultation     Referral Reason:   Specialty Services Required     Requested Specialty:   Nephrology     Number of Visits Requested:   1       Follow up: 4 week(s)

## 2020-10-16 ENCOUNTER — TELEPHONE (OUTPATIENT)
Dept: FAMILY MEDICINE CLINIC | Facility: CLINIC | Age: 85
End: 2020-10-16

## 2020-10-16 NOTE — TELEPHONE ENCOUNTER
Advised patient that Dr salomon stopped her BP medication and that will help her sodium to come up. Also explained she does not need a sodium medication since it is readily available as table salt which she can add some to her meals to elevate her level.

## 2020-10-16 NOTE — TELEPHONE ENCOUNTER
"She declined to go to kidney doctor \"[facility] in [place]\"---Vivienne is there anyway she can get ride from someone in her [place]?  "

## 2020-10-16 NOTE — PATIENT INSTRUCTIONS

## 2020-10-16 NOTE — TELEPHONE ENCOUNTER
PATIENT CALLED STATING SHE WAS SEEN A LITTLE BIT AGO AND HAD BLOOD WORK DONE. SHE STATES THAT DR CABEZAS SAID SHE WAS LOW IN SODIUM. SHE WAS WONDERING IF HE WANTED HER TO BE ON MEDICATION AND WANTS TO SPEAK WITH HIM.    PLEASE CALL AND ADVISE:  [phone]

## 2020-10-19 NOTE — TELEPHONE ENCOUNTER
Devora again decline seeing the kidney doc.  She said that she does not want to ask someone to take her.

## 2020-10-30 RX ORDER — LOVASTATIN 10 MG/1
10 TABLET ORAL DAILY
Qty: 90 TABLET | Refills: 0 | Status: SHIPPED | OUTPATIENT
Start: 2020-10-30

## 2020-11-10 ENCOUNTER — OFFICE VISIT (OUTPATIENT)
Dept: FAMILY MEDICINE CLINIC | Facility: CLINIC | Age: 85
End: 2020-11-10

## 2020-11-10 VITALS
WEIGHT: 140 LBS | SYSTOLIC BLOOD PRESSURE: 102 MMHG | DIASTOLIC BLOOD PRESSURE: 68 MMHG | HEART RATE: 69 BPM | RESPIRATION RATE: 16 BRPM | HEIGHT: 65 IN | BODY MASS INDEX: 23.32 KG/M2 | OXYGEN SATURATION: 97 %

## 2020-11-10 DIAGNOSIS — I10 ESSENTIAL HYPERTENSION: Primary | ICD-10-CM

## 2020-11-10 DIAGNOSIS — E87.1 HYPONATREMIA: ICD-10-CM

## 2020-11-10 PROCEDURE — 99213 OFFICE O/P EST LOW 20 MIN: CPT | Performed by: FAMILY MEDICINE

## 2020-11-10 NOTE — PROGRESS NOTES
Subjective   Devora Grant is a 86 y.o. female.     Chief Complaint   Patient presents with   • Follow-up     4 wk   closed head injury        History of Present Illness     she did not make appt with nephrology due to the fact it conflicted with her eye appt--she does not moniitor her bp      Current Outpatient Medications:   •  apixaban (Eliquis) 5 MG tablet tablet, Take 1 tablet by mouth Every 12 (Twelve) Hours., Disp: 180 tablet, Rfl: 1  •  Calcium Citrate-Vitamin D (CALCIUM + D PO), Take 500 mg by mouth daily., Disp: , Rfl:   •  carvedilol (COREG) 25 MG tablet, TAKE 1 TABLET BY MOUTH TWICE DAILY WITH MEALS, Disp: 60 tablet, Rfl: 5  •  dilTIAZem (TIAZAC) 180 MG 24 hr capsule, TAKE 1 CAPSULE BY MOUTH EVERY DAY, Disp: 90 capsule, Rfl: 1  •  dilTIAZem CD (CARDIZEM CD) 180 MG 24 hr capsule, TAKE 1 CAPSULE BY MOUTH DAILY, Disp: 90 capsule, Rfl: 1  •  hydrALAZINE (APRESOLINE) 25 MG tablet, TAKE 1 TABLET BY MOUTH THREE TIMES DAILY, Disp: 270 tablet, Rfl: 0  •  LOTEMAX 0.5 % ophthalmic suspension, Apply 1 drop to eye(s) as directed by provider 4 (Four) Times a Day., Disp: 3 each, Rfl: 1  •  lovastatin (MEVACOR) 10 MG tablet, Take 1 tablet by mouth Daily., Disp: 90 tablet, Rfl: 0  •  spironolactone (ALDACTONE) 50 MG tablet, TAKE 1 TABLET BY MOUTH DAILY, Disp: 90 tablet, Rfl: 2  •  valsartan (Diovan) 320 MG tablet, Take 1 tablet by mouth Daily., Disp: 30 tablet, Rfl: 5  Allergies   Allergen Reactions   • Penicillins Hives   • Sulfa Antibiotics Hives   • Sulfasalazine Unknown (See Comments)       Past Medical History:   Diagnosis Date   • Atrial fibrillation (CMS/HCC)    • Cancer (CMS/HCC)    • Cholelithiasis    • Colon polyp    • Hyperlipidemia    • Hypertension      Past Surgical History:   Procedure Laterality Date   • APPENDECTOMY     • BREAST SURGERY     • COLONOSCOPY  10/2015    Polyps, fair prep, recall 1 year   • COLONOSCOPY      10 yr ago, Dr. Valiente   • HYSTERECTOMY     • MASTECTOMY      Left       Review of  "Systems   Constitutional: Negative.    HENT: Negative.    Eyes: Negative.    Respiratory: Negative.    Cardiovascular: Negative.    Gastrointestinal: Negative.    Endocrine: Negative.    Genitourinary: Negative.    Musculoskeletal: Negative.    Skin: Negative.    Allergic/Immunologic: Negative.    Neurological: Negative.    Hematological: Negative.    Psychiatric/Behavioral: Negative.        Objective  /68   Pulse 69   Resp 16   Ht 165.1 cm (65\")   Wt 63.5 kg (140 lb)   SpO2 97%   BMI 23.30 kg/m²   Physical Exam  Vitals signs and nursing note reviewed.   Constitutional:       Appearance: Normal appearance.   HENT:      Head: Normocephalic and atraumatic.      Right Ear: Ear canal normal.      Left Ear: Ear canal normal.      Nose: Nose normal.      Mouth/Throat:      Mouth: Mucous membranes are dry.   Eyes:      Extraocular Movements: Extraocular movements intact.      Pupils: Pupils are equal, round, and reactive to light.   Neck:      Musculoskeletal: Normal range of motion and neck supple.   Cardiovascular:      Rate and Rhythm: Normal rate and regular rhythm.      Pulses: Normal pulses.      Heart sounds: Normal heart sounds.   Pulmonary:      Effort: Pulmonary effort is normal.      Breath sounds: Normal breath sounds.   Abdominal:      General: Abdomen is flat. Bowel sounds are normal.      Palpations: Abdomen is soft.   Musculoskeletal: Normal range of motion.   Skin:     General: Skin is warm and dry.      Capillary Refill: Capillary refill takes less than 2 seconds.   Neurological:      General: No focal deficit present.      Mental Status: She is alert and oriented to person, place, and time. Mental status is at baseline.   Psychiatric:         Mood and Affect: Mood normal.         Behavior: Behavior normal.         Thought Content: Thought content normal.         Judgment: Judgment normal.         Assessment/Plan   Diagnoses and all orders for this visit:    1. Essential hypertension " (Primary)  -     Basic metabolic panel  -     Ambulatory Referral to Nephrology    2. Hyponatremia  -     Basic metabolic panel  -     Ambulatory Referral to Nephrology                 Orders Placed This Encounter   Procedures   • Basic metabolic panel   • Ambulatory Referral to Nephrology     Referral Priority:   Routine     Referral Type:   Consultation     Referral Reason:   Specialty Services Required     Requested Specialty:   Nephrology     Number of Visits Requested:   1       Follow up: 4 month(s)

## 2020-11-11 LAB
BUN SERPL-MCNC: 13 MG/DL (ref 8–23)
BUN/CREAT SERPL: 13.7 (ref 7–25)
CALCIUM SERPL-MCNC: 9.9 MG/DL (ref 8.6–10.5)
CHLORIDE SERPL-SCNC: 89 MMOL/L (ref 98–107)
CO2 SERPL-SCNC: 28.2 MMOL/L (ref 22–29)
CREAT SERPL-MCNC: 0.95 MG/DL (ref 0.57–1)
GLUCOSE SERPL-MCNC: 95 MG/DL (ref 65–99)
POTASSIUM SERPL-SCNC: 4.8 MMOL/L (ref 3.5–5.2)
SODIUM SERPL-SCNC: 127 MMOL/L (ref 136–145)

## 2020-11-13 RX ORDER — HYDRALAZINE HYDROCHLORIDE 25 MG/1
25 TABLET, FILM COATED ORAL 3 TIMES DAILY
Qty: 270 TABLET | Refills: 1 | Status: SHIPPED | OUTPATIENT
Start: 2020-11-13 | End: 2021-03-10 | Stop reason: SDUPTHER

## 2020-12-11 ENCOUNTER — TELEPHONE (OUTPATIENT)
Dept: FAMILY MEDICINE CLINIC | Facility: CLINIC | Age: 85
End: 2020-12-11

## 2020-12-11 NOTE — TELEPHONE ENCOUNTER
I called Devora and she says that she has what she thinks are lab orders from Dr. Cordova to be done 1 wk before her telephone visit w/ him on 1/4/21.  I told her to bring the paper by the office sometime and we would check to see if it was the lab order and go from there.

## 2020-12-11 NOTE — TELEPHONE ENCOUNTER
PATIENT CALLING IN WANTING TO SEE IF DR. HANCOCK HAS SENT ANYTHING OVER SHOWING THAT SHE NEEDS TO HAVE BLOOD WORK DONE.    PATIENT REQUESTING CALLBACK# [phone]

## 2020-12-28 ENCOUNTER — LAB (OUTPATIENT)
Dept: FAMILY MEDICINE CLINIC | Facility: CLINIC | Age: 85
End: 2020-12-28

## 2021-02-08 RX ORDER — CARVEDILOL 25 MG/1
TABLET ORAL
Qty: 60 TABLET | Refills: 5 | Status: SHIPPED | OUTPATIENT
Start: 2021-02-08 | End: 2021-03-10 | Stop reason: SDUPTHER

## 2021-02-08 RX ORDER — APIXABAN 5 MG/1
TABLET, FILM COATED ORAL
Qty: 180 TABLET | Refills: 1 | Status: SHIPPED | OUTPATIENT
Start: 2021-02-08 | End: 2021-08-09

## 2021-02-08 RX ORDER — CARVEDILOL 25 MG/1
TABLET ORAL
Qty: 60 TABLET | Refills: 5 | Status: SHIPPED | OUTPATIENT
Start: 2021-02-08 | End: 2021-03-10

## 2021-03-10 ENCOUNTER — OFFICE VISIT (OUTPATIENT)
Dept: FAMILY MEDICINE CLINIC | Facility: CLINIC | Age: 86
End: 2021-03-10

## 2021-03-10 VITALS
OXYGEN SATURATION: 98 % | WEIGHT: 147 LBS | DIASTOLIC BLOOD PRESSURE: 72 MMHG | RESPIRATION RATE: 16 BRPM | BODY MASS INDEX: 24.49 KG/M2 | SYSTOLIC BLOOD PRESSURE: 112 MMHG | HEIGHT: 65 IN | HEART RATE: 71 BPM

## 2021-03-10 DIAGNOSIS — I10 ESSENTIAL HYPERTENSION: Primary | ICD-10-CM

## 2021-03-10 DIAGNOSIS — I48.19 PERSISTENT ATRIAL FIBRILLATION (HCC): ICD-10-CM

## 2021-03-10 PROCEDURE — 99213 OFFICE O/P EST LOW 20 MIN: CPT | Performed by: FAMILY MEDICINE

## 2021-03-10 RX ORDER — HYDRALAZINE HYDROCHLORIDE 25 MG/1
25 TABLET, FILM COATED ORAL 3 TIMES DAILY
Qty: 270 TABLET | Refills: 1 | Status: SHIPPED | OUTPATIENT
Start: 2021-03-10 | End: 2021-11-05

## 2021-03-10 RX ORDER — DILTIAZEM HYDROCHLORIDE 180 MG/1
180 CAPSULE, COATED, EXTENDED RELEASE ORAL DAILY
Qty: 90 CAPSULE | Refills: 1 | Status: SHIPPED | OUTPATIENT
Start: 2021-03-10

## 2021-03-10 RX ORDER — CARVEDILOL 25 MG/1
25 TABLET ORAL 2 TIMES DAILY WITH MEALS
Qty: 180 TABLET | Refills: 1 | Status: SHIPPED | OUTPATIENT
Start: 2021-03-10 | End: 2021-11-05

## 2021-03-10 NOTE — PROGRESS NOTES
Subjective   Devora Grant is a 86 y.o. female.     Chief Complaint   Patient presents with   • Follow-up     4 mo   htn   Devora says that she was told by Dr. Cordova to stop taking her valsartan, lovastatin, and spironolactone due to her shoulder and arm pain.  she says that she needs an alternative for lovastatin and valsartan        History of Present Illness     she nots good bp control witoput cp or ha---tolerating elqiuis withotu bleedding       Current Outpatient Medications:   •  Calcium Citrate-Vitamin D (CALCIUM + D PO), Take 500 mg by mouth daily., Disp: , Rfl:   •  carvedilol (COREG) 25 MG tablet, TAKE 1 TABLET BY MOUTH TWICE DAILY WITH MEALS, Disp: 60 tablet, Rfl: 5  •  dilTIAZem CD (CARDIZEM CD) 180 MG 24 hr capsule, TAKE 1 CAPSULE BY MOUTH DAILY, Disp: 90 capsule, Rfl: 1  •  Eliquis 5 MG tablet tablet, TAKE 1 TABLET BY MOUTH EVERY 12 HOURS, Disp: 180 tablet, Rfl: 1  •  hydrALAZINE (APRESOLINE) 25 MG tablet, Take 1 tablet by mouth 3 (Three) Times a Day., Disp: 270 tablet, Rfl: 1  •  LOTEMAX 0.5 % ophthalmic suspension, Apply 1 drop to eye(s) as directed by provider 4 (Four) Times a Day., Disp: 3 each, Rfl: 1  •  lovastatin (MEVACOR) 10 MG tablet, Take 1 tablet by mouth Daily., Disp: 90 tablet, Rfl: 0  •  spironolactone (ALDACTONE) 50 MG tablet, TAKE 1 TABLET BY MOUTH DAILY, Disp: 90 tablet, Rfl: 2  •  valsartan (Diovan) 320 MG tablet, Take 1 tablet by mouth Daily., Disp: 30 tablet, Rfl: 5  Allergies   Allergen Reactions   • Penicillins Hives   • Sulfa Antibiotics Hives   • Sulfasalazine Unknown (See Comments)       Past Medical History:   Diagnosis Date   • Atrial fibrillation (CMS/HCC)    • Cancer (CMS/HCC)    • Cholelithiasis    • Colon polyp    • Hyperlipidemia    • Hypertension      Past Surgical History:   Procedure Laterality Date   • APPENDECTOMY     • BREAST SURGERY     • COLONOSCOPY  10/2015    Polyps, fair prep, recall 1 year   • COLONOSCOPY      10 yr ago, Dr. Valiente   • HYSTERECTOMY     •  "MASTECTOMY      Left       Review of Systems   Constitutional: Negative.    HENT: Negative.    Eyes: Negative.    Respiratory: Negative.    Cardiovascular: Negative.    Gastrointestinal: Negative.    Endocrine: Negative.    Genitourinary: Negative.    Musculoskeletal: Negative.    Skin: Negative.    Allergic/Immunologic: Negative.    Neurological: Negative.    Hematological: Negative.    Psychiatric/Behavioral: Negative.        Objective  /72   Pulse 71   Resp 16   Ht 165.1 cm (65\")   Wt 66.7 kg (147 lb)   SpO2 98%   BMI 24.46 kg/m²   Physical Exam  Vitals and nursing note reviewed.   Constitutional:       Appearance: Normal appearance.   HENT:      Head: Normocephalic and atraumatic.      Nose: Nose normal.      Mouth/Throat:      Mouth: Mucous membranes are moist.   Eyes:      Extraocular Movements: Extraocular movements intact.      Pupils: Pupils are equal, round, and reactive to light.   Cardiovascular:      Rate and Rhythm: Normal rate and regular rhythm.      Pulses: Normal pulses.      Heart sounds: Normal heart sounds.   Abdominal:      General: Abdomen is flat.      Palpations: Abdomen is soft.   Musculoskeletal:         General: Normal range of motion.      Cervical back: Normal range of motion and neck supple.   Skin:     General: Skin is warm and dry.      Capillary Refill: Capillary refill takes less than 2 seconds.   Neurological:      General: No focal deficit present.      Mental Status: She is alert and oriented to person, place, and time. Mental status is at baseline.   Psychiatric:         Mood and Affect: Mood normal.         Behavior: Behavior normal.         Thought Content: Thought content normal.         Judgment: Judgment normal.         Assessment/Plan   Diagnoses and all orders for this visit:    1. Essential hypertension (Primary)  -     CBC & Differential  -     Basic metabolic panel    2. Persistent atrial fibrillation (CMS/HCC)  -     CBC & Differential  -     Basic " metabolic panel        She will monitor bp at home           Orders Placed This Encounter   Procedures   • Basic metabolic panel   • CBC & Differential       Follow up: 6 month(s)

## 2021-03-11 LAB
BASOPHILS # BLD AUTO: 0.09 10*3/MM3 (ref 0–0.2)
BASOPHILS NFR BLD AUTO: 1.7 % (ref 0–1.5)
BUN SERPL-MCNC: 13 MG/DL (ref 8–23)
BUN/CREAT SERPL: 19.1 (ref 7–25)
CALCIUM SERPL-MCNC: 9.5 MG/DL (ref 8.6–10.5)
CHLORIDE SERPL-SCNC: 97 MMOL/L (ref 98–107)
CO2 SERPL-SCNC: 32.7 MMOL/L (ref 22–29)
CREAT SERPL-MCNC: 0.68 MG/DL (ref 0.57–1)
EOSINOPHIL # BLD AUTO: 0.21 10*3/MM3 (ref 0–0.4)
EOSINOPHIL NFR BLD AUTO: 4.1 % (ref 0.3–6.2)
ERYTHROCYTE [DISTWIDTH] IN BLOOD BY AUTOMATED COUNT: 12.3 % (ref 12.3–15.4)
GLUCOSE SERPL-MCNC: 98 MG/DL (ref 65–99)
HCT VFR BLD AUTO: 37.2 % (ref 34–46.6)
HGB BLD-MCNC: 12.3 G/DL (ref 12–15.9)
IMM GRANULOCYTES # BLD AUTO: 0.01 10*3/MM3 (ref 0–0.05)
IMM GRANULOCYTES NFR BLD AUTO: 0.2 % (ref 0–0.5)
LYMPHOCYTES # BLD AUTO: 0.82 10*3/MM3 (ref 0.7–3.1)
LYMPHOCYTES NFR BLD AUTO: 15.9 % (ref 19.6–45.3)
MCH RBC QN AUTO: 31 PG (ref 26.6–33)
MCHC RBC AUTO-ENTMCNC: 33.1 G/DL (ref 31.5–35.7)
MCV RBC AUTO: 93.7 FL (ref 79–97)
MONOCYTES # BLD AUTO: 0.57 10*3/MM3 (ref 0.1–0.9)
MONOCYTES NFR BLD AUTO: 11.1 % (ref 5–12)
NEUTROPHILS # BLD AUTO: 3.45 10*3/MM3 (ref 1.7–7)
NEUTROPHILS NFR BLD AUTO: 67 % (ref 42.7–76)
NRBC BLD AUTO-RTO: 0 /100 WBC (ref 0–0.2)
PLATELET # BLD AUTO: 177 10*3/MM3 (ref 140–450)
POTASSIUM SERPL-SCNC: 3.9 MMOL/L (ref 3.5–5.2)
RBC # BLD AUTO: 3.97 10*6/MM3 (ref 3.77–5.28)
SODIUM SERPL-SCNC: 142 MMOL/L (ref 136–145)
WBC # BLD AUTO: 5.15 10*3/MM3 (ref 3.4–10.8)

## 2021-08-09 RX ORDER — APIXABAN 5 MG/1
TABLET, FILM COATED ORAL
Qty: 180 TABLET | Refills: 1 | Status: SHIPPED | OUTPATIENT
Start: 2021-08-09 | End: 2022-02-28

## 2021-09-10 ENCOUNTER — OFFICE VISIT (OUTPATIENT)
Dept: FAMILY MEDICINE CLINIC | Facility: CLINIC | Age: 86
End: 2021-09-10

## 2021-09-10 VITALS
TEMPERATURE: 97.9 F | OXYGEN SATURATION: 98 % | RESPIRATION RATE: 16 BRPM | SYSTOLIC BLOOD PRESSURE: 126 MMHG | DIASTOLIC BLOOD PRESSURE: 72 MMHG | HEART RATE: 90 BPM | WEIGHT: 128 LBS | BODY MASS INDEX: 21.33 KG/M2 | HEIGHT: 65 IN

## 2021-09-10 DIAGNOSIS — E78.2 MIXED HYPERLIPIDEMIA: Primary | ICD-10-CM

## 2021-09-10 DIAGNOSIS — I10 ESSENTIAL HYPERTENSION: ICD-10-CM

## 2021-09-10 PROCEDURE — 99213 OFFICE O/P EST LOW 20 MIN: CPT | Performed by: FAMILY MEDICINE

## 2021-09-10 NOTE — PROGRESS NOTES
Subjective   Devora Grant is a 87 y.o. female.     Chief Complaint   Patient presents with   • Hypertension     6mo follow up         History of Present Illness     she nots good bp contorl nilsa cp or ha---she is toleing statian fern arreaga      Current Outpatient Medications:   •  Calcium Citrate-Vitamin D (CALCIUM + D PO), Take 500 mg by mouth daily., Disp: , Rfl:   •  carvedilol (COREG) 25 MG tablet, Take 1 tablet by mouth 2 (Two) Times a Day With Meals., Disp: 180 tablet, Rfl: 1  •  dilTIAZem CD (CARDIZEM CD) 180 MG 24 hr capsule, Take 1 capsule by mouth Daily., Disp: 90 capsule, Rfl: 1  •  Eliquis 5 MG tablet tablet, TAKE 1 TABLET BY MOUTH EVERY 12 HOURS, Disp: 180 tablet, Rfl: 1  •  hydrALAZINE (APRESOLINE) 25 MG tablet, Take 1 tablet by mouth 3 (Three) Times a Day., Disp: 270 tablet, Rfl: 1  •  LOTEMAX 0.5 % ophthalmic suspension, Apply 1 drop to eye(s) as directed by provider 4 (Four) Times a Day., Disp: 3 each, Rfl: 1  •  lovastatin (MEVACOR) 10 MG tablet, Take 1 tablet by mouth Daily., Disp: 90 tablet, Rfl: 0  •  spironolactone (ALDACTONE) 50 MG tablet, TAKE 1 TABLET BY MOUTH DAILY, Disp: 90 tablet, Rfl: 2  •  valsartan (Diovan) 320 MG tablet, Take 1 tablet by mouth Daily., Disp: 30 tablet, Rfl: 5  Allergies   Allergen Reactions   • Penicillins Hives   • Sulfa Antibiotics Hives   • Sulfasalazine Unknown (See Comments)       Past Medical History:   Diagnosis Date   • Atrial fibrillation (CMS/HCC)    • Cancer (CMS/HCC)    • Cholelithiasis    • Colon polyp    • Hyperlipidemia    • Hypertension      Past Surgical History:   Procedure Laterality Date   • APPENDECTOMY     • BREAST SURGERY     • COLONOSCOPY  10/2015    Polyps, fair prep, recall 1 year   • COLONOSCOPY      10 yr ago, Dr. Valiente   • HYSTERECTOMY     • MASTECTOMY      Left       Review of Systems   Constitutional: Negative.    HENT: Negative.    Eyes: Negative.    Respiratory: Negative.    Cardiovascular: Negative.    Gastrointestinal:  "Negative.    Endocrine: Negative.    Genitourinary: Negative.    Musculoskeletal: Negative.    Skin: Negative.    Allergic/Immunologic: Negative.    Neurological: Negative.    Hematological: Negative.    Psychiatric/Behavioral: Negative.        Objective  /72   Pulse 90   Temp 97.9 °F (36.6 °C) (Infrared)   Resp 16   Ht 165.1 cm (65\")   Wt 58.1 kg (128 lb)   SpO2 98%   BMI 21.30 kg/m²   Physical Exam  Vitals and nursing note reviewed.   Constitutional:       Appearance: Normal appearance. She is normal weight.   HENT:      Head: Normocephalic and atraumatic.      Nose: Nose normal.      Mouth/Throat:      Mouth: Mucous membranes are moist.   Eyes:      Extraocular Movements: Extraocular movements intact.      Pupils: Pupils are equal, round, and reactive to light.   Cardiovascular:      Rate and Rhythm: Normal rate and regular rhythm.      Pulses: Normal pulses.      Heart sounds: Normal heart sounds.   Pulmonary:      Effort: Pulmonary effort is normal.      Breath sounds: Normal breath sounds.   Abdominal:      General: Abdomen is flat. Bowel sounds are normal.      Palpations: Abdomen is soft.   Musculoskeletal:         General: Normal range of motion.      Cervical back: Normal range of motion.   Skin:     General: Skin is warm and dry.      Capillary Refill: Capillary refill takes less than 2 seconds.   Neurological:      General: No focal deficit present.      Mental Status: She is alert and oriented to person, place, and time. Mental status is at baseline.   Psychiatric:         Mood and Affect: Mood normal.         Behavior: Behavior normal.         Thought Content: Thought content normal.         Judgment: Judgment normal.         Assessment/Plan   Diagnoses and all orders for this visit:    1. Mixed hyperlipidemia (Primary)  -     CBC & Differential  -     Comprehensive metabolic panel  -     Lipid Panel With / Chol / HDL Ratio    2. Essential hypertension  -     CBC & Differential  -     " Comprehensive metabolic panel  -     Lipid Panel With / Chol / HDL Ratio        She will monitor bp and keep me informed  She will monitor for myalgia s         Orders Placed This Encounter   Procedures   • Comprehensive metabolic panel     Order Specific Question:   Release to patient     Answer:   Immediate   • Lipid Panel With / Chol / HDL Ratio     Order Specific Question:   Release to patient     Answer:   Immediate   • CBC & Differential       Follow up: 6 month(s)

## 2021-09-11 LAB
ALBUMIN SERPL-MCNC: 4.1 G/DL (ref 3.5–5.2)
ALBUMIN/GLOB SERPL: 1.6 G/DL
ALP SERPL-CCNC: 51 U/L (ref 39–117)
ALT SERPL-CCNC: 10 U/L (ref 1–33)
AST SERPL-CCNC: 19 U/L (ref 1–32)
BASOPHILS # BLD AUTO: 0.06 10*3/MM3 (ref 0–0.2)
BASOPHILS NFR BLD AUTO: 1.5 % (ref 0–1.5)
BILIRUB SERPL-MCNC: 0.5 MG/DL (ref 0–1.2)
BUN SERPL-MCNC: 14 MG/DL (ref 8–23)
BUN/CREAT SERPL: 23.3 (ref 7–25)
CALCIUM SERPL-MCNC: 9.4 MG/DL (ref 8.6–10.5)
CHLORIDE SERPL-SCNC: 101 MMOL/L (ref 98–107)
CHOLEST SERPL-MCNC: 219 MG/DL (ref 0–200)
CHOLEST/HDLC SERPL: 3.78 {RATIO}
CO2 SERPL-SCNC: 32.2 MMOL/L (ref 22–29)
CREAT SERPL-MCNC: 0.6 MG/DL (ref 0.57–1)
EOSINOPHIL # BLD AUTO: 0.14 10*3/MM3 (ref 0–0.4)
EOSINOPHIL NFR BLD AUTO: 3.6 % (ref 0.3–6.2)
ERYTHROCYTE [DISTWIDTH] IN BLOOD BY AUTOMATED COUNT: 13.1 % (ref 12.3–15.4)
GLOBULIN SER CALC-MCNC: 2.5 GM/DL
GLUCOSE SERPL-MCNC: 97 MG/DL (ref 65–99)
HCT VFR BLD AUTO: 39.1 % (ref 34–46.6)
HDLC SERPL-MCNC: 58 MG/DL (ref 40–60)
HGB BLD-MCNC: 12.7 G/DL (ref 12–15.9)
IMM GRANULOCYTES # BLD AUTO: 0.01 10*3/MM3 (ref 0–0.05)
IMM GRANULOCYTES NFR BLD AUTO: 0.3 % (ref 0–0.5)
LDLC SERPL CALC-MCNC: 151 MG/DL (ref 0–100)
LYMPHOCYTES # BLD AUTO: 0.81 10*3/MM3 (ref 0.7–3.1)
LYMPHOCYTES NFR BLD AUTO: 20.7 % (ref 19.6–45.3)
MCH RBC QN AUTO: 31.1 PG (ref 26.6–33)
MCHC RBC AUTO-ENTMCNC: 32.5 G/DL (ref 31.5–35.7)
MCV RBC AUTO: 95.6 FL (ref 79–97)
MONOCYTES # BLD AUTO: 0.47 10*3/MM3 (ref 0.1–0.9)
MONOCYTES NFR BLD AUTO: 12 % (ref 5–12)
NEUTROPHILS # BLD AUTO: 2.43 10*3/MM3 (ref 1.7–7)
NEUTROPHILS NFR BLD AUTO: 61.9 % (ref 42.7–76)
NRBC BLD AUTO-RTO: 0 /100 WBC (ref 0–0.2)
PLATELET # BLD AUTO: 135 10*3/MM3 (ref 140–450)
POTASSIUM SERPL-SCNC: 4 MMOL/L (ref 3.5–5.2)
PROT SERPL-MCNC: 6.6 G/DL (ref 6–8.5)
RBC # BLD AUTO: 4.09 10*6/MM3 (ref 3.77–5.28)
SODIUM SERPL-SCNC: 141 MMOL/L (ref 136–145)
TRIGL SERPL-MCNC: 58 MG/DL (ref 0–150)
VLDLC SERPL CALC-MCNC: 10 MG/DL (ref 5–40)
WBC # BLD AUTO: 3.92 10*3/MM3 (ref 3.4–10.8)

## 2021-09-13 ENCOUNTER — TELEPHONE (OUTPATIENT)
Dept: FAMILY MEDICINE CLINIC | Facility: CLINIC | Age: 86
End: 2021-09-13

## 2021-09-13 NOTE — TELEPHONE ENCOUNTER
Caller: Devora Grant    Relationship to patient: Self    Best call back number: [phone]     Patient is needing: PATIENT WOULD LIKE A CALL BACK. PATIENT WOULD LIKE TO DISCUSS HER TAKING lovastatin (MEVACOR) 10 MG tablet.

## 2021-11-05 RX ORDER — SPIRONOLACTONE 50 MG/1
50 TABLET, FILM COATED ORAL DAILY
Qty: 90 TABLET | Refills: 2 | Status: SHIPPED | OUTPATIENT
Start: 2021-11-05

## 2021-11-05 RX ORDER — HYDRALAZINE HYDROCHLORIDE 25 MG/1
TABLET, FILM COATED ORAL
Qty: 270 TABLET | Refills: 1 | Status: SHIPPED | OUTPATIENT
Start: 2021-11-05 | End: 2022-05-03

## 2021-11-05 RX ORDER — CARVEDILOL 25 MG/1
TABLET ORAL
Qty: 180 TABLET | Refills: 1 | Status: SHIPPED | OUTPATIENT
Start: 2021-11-05 | End: 2022-03-04

## 2021-11-24 NOTE — PROGRESS NOTES
11/24/2021 9:47 AM  Service: Urology  Group: [facility] urology (Jayesh/Wilbert/Isaac)    Gonzalo West  [id number]    Subjective   Devora Grant is a 85 y.o. female.     Chief Complaint   Patient presents with   • Follow-up     6 mo   htn       History of Present Illness     she nots good bp control wituout cp or ha---she is toleaging eliquis for afib witout bleeding --toleratging statin drugs witout myalgias--she recently had sneezing anc clear rhinorrhea which resolved wtih benadrul      Current Outpatient Medications:   •  Calcium Citrate-Vitamin D (CALCIUM + D PO), Take 500 mg by mouth daily., Disp: , Rfl:   •  carvedilol (COREG) 25 MG tablet, Take 1 tablet by mouth Daily., Disp: 90 tablet, Rfl: 1  •  carvedilol (COREG) 25 MG tablet, TAKE 1 TABLET BY MOUTH TWICE DAILY WITH MEALS, Disp: 60 tablet, Rfl: 0  •  diltiaZEM (TIAZAC) 180 MG 24 hr capsule, Take 1 capsule by mouth Daily., Disp: 90 capsule, Rfl: 1  •  ELIQUIS 5 MG tablet tablet, TAKE 1 TABLET BY MOUTH EVERY 12 HOURS, Disp: 180 tablet, Rfl: 1  •  hydrALAZINE (APRESOLINE) 25 MG tablet, Take 1 tablet by mouth 3 (Three) Times a Day., Disp: 270 tablet, Rfl: 1  •  losartan (COZAAR) 100 MG tablet, TAKE 1 TABLET BY MOUTH DAILY, Disp: 90 tablet, Rfl: 0  •  losartan (COZAAR) 100 MG tablet, TAKE 1 TABLET BY MOUTH DAILY, Disp: 90 tablet, Rfl: 0  •  LOTEMAX 0.5 % ophthalmic suspension, Apply 1 drop to eye(s) as directed by provider 4 (Four) Times a Day., Disp: 3 each, Rfl: 1  •  lovastatin (MEVACOR) 10 MG tablet, TAKE 1 TABLET BY MOUTH DAILY, Disp: 90 tablet, Rfl: 0  •  potassium chloride (K-DUR,KLOR-CON) 20 MEQ CR tablet, Take 1 tablet by mouth Daily., Disp: 2 tablet, Rfl: 0  •  spironolactone (ALDACTONE) 50 MG tablet, Take 1 tablet by mouth Daily., Disp: 90 tablet, Rfl: 2  Allergies   Allergen Reactions   • Penicillins Hives   • Sulfa Antibiotics Hives   • Sulfasalazine Unknown (See Comments)       Past Medical History:   Diagnosis Date   • Atrial fibrillation (CMS/HCC)    • Cancer (CMS/HCC)    • Cholelithiasis    • Colon polyp    • Hyperlipidemia    • Hypertension      Past  "Surgical History:   Procedure Laterality Date   • APPENDECTOMY     • BREAST SURGERY     • COLONOSCOPY  10/2015    Polyps, fair prep, recall 1 year   • COLONOSCOPY      10 yr ago, Dr. Valiente   • HYSTERECTOMY     • MASTECTOMY      Left       Review of Systems   Constitutional: Negative.    HENT: Positive for congestion, postnasal drip and rhinorrhea.    Eyes: Negative.    Respiratory: Negative.    Cardiovascular: Negative.    Gastrointestinal: Negative.    Endocrine: Negative.    Genitourinary: Negative.    Musculoskeletal: Negative.    Skin: Negative.    Allergic/Immunologic: Negative.    Neurological: Negative.    Hematological: Negative.    Psychiatric/Behavioral: Negative.        Objective  /80   Pulse 63   Resp 16   Ht 165.1 cm (65\")   Wt 79.8 kg (176 lb)   SpO2 97%   BMI 29.29 kg/m²   Physical Exam   Constitutional: She is oriented to person, place, and time. She appears well-developed and well-nourished.   HENT:   Head: Normocephalic and atraumatic.   Right Ear: External ear normal.   Left Ear: External ear normal.   Nose: Nose normal.   Mouth/Throat: Oropharynx is clear and moist.   Eyes: Conjunctivae and EOM are normal. Pupils are equal, round, and reactive to light.   Neck: Normal range of motion. Neck supple.   Cardiovascular: Normal rate, normal heart sounds and intact distal pulses.   Pulmonary/Chest: Effort normal and breath sounds normal.   Abdominal: Soft. Bowel sounds are normal.   Musculoskeletal: Normal range of motion.   Neurological: She is alert and oriented to person, place, and time.   Skin: Skin is warm. Capillary refill takes less than 2 seconds.   Psychiatric: She has a normal mood and affect. Her behavior is normal. Judgment and thought content normal.   Nursing note and vitals reviewed.      Assessment/Plan   Devora was seen today for follow-up.    Diagnoses and all orders for this visit:    Essential hypertension    Mixed hyperlipidemia    Persistent atrial fibrillation "           Receipt:   Age:  59 Y       Sex:  M                Date           11/23/2021 15:59                                           Reported:   Date Of Birth:   1960   Financial        [id number]            Admitting      IVETTE VALVERDE   Number:                                 Physician:   Patient          [id number]      [id number]       Ordering       PHILLIP MANNING   Location:                               Physician:     Accession Number:  [id number]                                              Additional Physicians:LISANDRA LLANOS   RICHARD Villegas - CNP   CAT  Urology (CMS/[facility])    Seasonal allergic rhinitis due to pollen      We reviewed labs   Current outpatient and discharge medications have been reconciled for the patient.  Reviewed by: Sandoval Christian MD      Patient's Body mass index is 29.29 kg/m². BMI is within normal parameters. No follow-up required..    No orders of the defined types were placed in this encounter.      Follow up: 6 month(s)

## 2022-02-28 RX ORDER — APIXABAN 5 MG/1
TABLET, FILM COATED ORAL
Qty: 180 TABLET | Refills: 1 | Status: SHIPPED | OUTPATIENT
Start: 2022-02-28 | End: 2022-08-05

## 2022-03-04 RX ORDER — CARVEDILOL 25 MG/1
TABLET ORAL
Qty: 180 TABLET | Refills: 1 | Status: SHIPPED | OUTPATIENT
Start: 2022-03-04 | End: 2022-09-23

## 2022-03-11 ENCOUNTER — OFFICE VISIT (OUTPATIENT)
Dept: FAMILY MEDICINE CLINIC | Facility: CLINIC | Age: 87
End: 2022-03-11

## 2022-03-11 VITALS
HEART RATE: 64 BPM | OXYGEN SATURATION: 99 % | WEIGHT: 130 LBS | SYSTOLIC BLOOD PRESSURE: 118 MMHG | RESPIRATION RATE: 20 BRPM | BODY MASS INDEX: 21.66 KG/M2 | HEIGHT: 65 IN | DIASTOLIC BLOOD PRESSURE: 72 MMHG

## 2022-03-11 DIAGNOSIS — I48.19 PERSISTENT ATRIAL FIBRILLATION: ICD-10-CM

## 2022-03-11 DIAGNOSIS — I10 ESSENTIAL HYPERTENSION: Primary | ICD-10-CM

## 2022-03-11 PROCEDURE — G0439 PPPS, SUBSEQ VISIT: HCPCS | Performed by: FAMILY MEDICINE

## 2022-03-11 PROCEDURE — 1170F FXNL STATUS ASSESSED: CPT | Performed by: FAMILY MEDICINE

## 2022-03-11 PROCEDURE — 1159F MED LIST DOCD IN RCRD: CPT | Performed by: FAMILY MEDICINE

## 2022-03-11 PROCEDURE — 1126F AMNT PAIN NOTED NONE PRSNT: CPT | Performed by: FAMILY MEDICINE

## 2022-03-11 PROCEDURE — 99213 OFFICE O/P EST LOW 20 MIN: CPT | Performed by: FAMILY MEDICINE

## 2022-03-11 NOTE — PROGRESS NOTES
The ABCs of the Annual Wellness Visit  Subsequent Medicare Wellness Visit    Chief Complaint   Patient presents with   • Hyperlipidemia     Follow up       Subjective    History of Present Illness:  Devora Grant is a 87 y.o. female who presents for a Subsequent Medicare Wellness Visit.    The following portions of the patient's history were reviewed and   updated as appropriate: allergies, current medications, past family history, past medical history, past social history, past surgical history and problem list.    Compared to one year ago, the patient feels her physical   health is the same.    Compared to one year ago, the patient feels her mental   health is the same.    Recent Hospitalizations:  She was not admitted to the hospital during the last year.       Current Medical Providers:  Patient Care Team:  Sandoval Christian MD as PCP - General (Family Medicine)  Kong Tse MD as Cardiologist (Cardiology)    Outpatient Medications Prior to Visit   Medication Sig Dispense Refill   • Calcium Citrate-Vitamin D (CALCIUM + D PO) Take 500 mg by mouth daily.     • carvedilol (COREG) 25 MG tablet TAKE 1 TABLET BY MOUTH TWICE DAILY WITH MEALS 180 tablet 1   • Eliquis 5 MG tablet tablet TAKE 1 TABLET BY MOUTH EVERY 12 HOURS 180 tablet 1   • hydrALAZINE (APRESOLINE) 25 MG tablet TAKE 1 TABLET BY MOUTH THREE TIMES DAILY 270 tablet 1   • LOTEMAX 0.5 % ophthalmic suspension Apply 1 drop to eye(s) as directed by provider 4 (Four) Times a Day. 3 each 1   • lovastatin (MEVACOR) 10 MG tablet Take 1 tablet by mouth Daily. 90 tablet 0   • spironolactone (ALDACTONE) 50 MG tablet TAKE 1 TABLET BY MOUTH DAILY 90 tablet 2   • valsartan (Diovan) 320 MG tablet Take 1 tablet by mouth Daily. 30 tablet 5   • dilTIAZem CD (CARDIZEM CD) 180 MG 24 hr capsule Take 1 capsule by mouth Daily. 90 capsule 1     No facility-administered medications prior to visit.       No opioid medication identified on active medication list. I  "have reviewed chart for other potential  high risk medication/s and harmful drug interactions in the elderly.          Aspirin is not on active medication list.  Aspirin use is not indicated based on review of current medical condition/s. Risk of harm outweighs potential benefits.  .    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  ACP discussion was held with the patient during this visit. Patient has an advance directive in EMR which is still valid.           Objective    Vitals:    03/11/22 0929   BP: 118/72   BP Location: Right arm   Patient Position: Sitting   Cuff Size: Adult   Pulse: 64   Resp: 20   SpO2: 99%   Weight: 59 kg (130 lb)   Height: 165.1 cm (65\")  Comment: pt reported.   PainSc: 0-No pain     BMI Readings from Last 1 Encounters:   03/11/22 21.63 kg/m²   BMI is within normal parameters. No follow-up required.    Does the patient have evidence of cognitive impairment? No    Physical Exam            HEALTH RISK ASSESSMENT    Smoking Status:  Social History     Tobacco Use   Smoking Status Never Smoker   Smokeless Tobacco Never Used     Alcohol Consumption:  Social History     Substance and Sexual Activity   Alcohol Use No     Fall Risk Screen:    STEMATIAS Fall Risk Assessment was completed, and patient is at LOW risk for falls.Assessment completed on:3/11/2022    Depression Screening:  PHQ-2/PHQ-9 Depression Screening 3/11/2022   Retired Total Score -   Little Interest or Pleasure in Doing Things 0-->not at all   Feeling Down, Depressed or Hopeless 0-->not at all   PHQ-9: Brief Depression Severity Measure Score 0       Health Habits " and Functional and Cognitive Screening:  Functional & Cognitive Status 9/16/2020   Do you have difficulty preparing food and eating? No   Do you have difficulty bathing yourself, getting dressed or grooming yourself? No   Do you have difficulty using the toilet? No   Do you have difficulty moving around from place to place? No   Do you have trouble with steps or getting out of a bed or a chair? No   Current Diet Well Balanced Diet   Dental Exam Up to date   Eye Exam Up to date   Exercise (times per week) 3 times per week   Current Exercise Activities Include Walking   Do you need help using the phone?  No   Are you deaf or do you have serious difficulty hearing?  No   Do you need help with transportation? No   Do you need help shopping? No   Do you need help preparing meals?  No   Do you need help with housework?  No   Do you need help with laundry? No   Do you need help taking your medications? No   Do you need help managing money? No   Do you ever drive or ride in a car without wearing a seat belt? No   Have you felt unusual stress, anger or loneliness in the last month? Yes   Who do you live with? Alone   If you need help, do you have trouble finding someone available to you? No   Have you been bothered in the last four weeks by sexual problems? No   Do you have difficulty concentrating, remembering or making decisions? No       Age-appropriate Screening Schedule:  Refer to the list below for future screening recommendations based on patient's age, sex and/or medical conditions. Orders for these recommended tests are listed in the plan section. The patient has been provided with a written plan.    Health Maintenance   Topic Date Due   • DXA SCAN  Never done   • INFLUENZA VACCINE  03/31/2022 (Originally 8/1/2021)   • TDAP/TD VACCINES (1 - Tdap) 09/01/2022 (Originally 6/13/1953)   • MAMMOGRAM  09/10/2022 (Originally 1934)   • ZOSTER VACCINE (1 of 2) 09/01/2023 (Originally 6/13/1984)   • LIPID PANEL  09/10/2022               Assessment/Plan   CMS Preventative Services Quick Reference  Risk Factors Identified During Encounter  Cardiovascular Disease  The above risks/problems have been discussed with the patient.  Follow up actions/plans if indicated are seen below in the Assessment/Plan Section.  Pertinent information has been shared with the patient in the After Visit Summary.    Diagnoses and all orders for this visit:    1. Essential hypertension (Primary)  -     CBC & Differential  -     Comprehensive metabolic panel    2. Persistent atrial fibrillation (HCC)  -     CBC & Differential  -     Comprehensive metabolic panel        Follow Up:   Return in about 6 months (around 9/11/2022).     An After Visit Summary and PPPS were made available to the patient.        I spent 5  minutes caring for Devora on this date of service. This time includes time spent by me in the following activities:preparing for the visit, reviewing tests, ordering medications, tests, or procedures, documenting information in the medical record and care coordination

## 2022-03-11 NOTE — PROGRESS NOTES
Subjective   Devora Grant is a 87 y.o. female.     Chief Complaint   Patient presents with   • Hyperlipidemia     Follow up         History of Present Illness     she is taking her bp meds --she is taking the eliquis witthout bleeding..      Current Outpatient Medications:   •  Calcium Citrate-Vitamin D (CALCIUM + D PO), Take 500 mg by mouth daily., Disp: , Rfl:   •  carvedilol (COREG) 25 MG tablet, TAKE 1 TABLET BY MOUTH TWICE DAILY WITH MEALS, Disp: 180 tablet, Rfl: 1  •  Eliquis 5 MG tablet tablet, TAKE 1 TABLET BY MOUTH EVERY 12 HOURS, Disp: 180 tablet, Rfl: 1  •  hydrALAZINE (APRESOLINE) 25 MG tablet, TAKE 1 TABLET BY MOUTH THREE TIMES DAILY, Disp: 270 tablet, Rfl: 1  •  LOTEMAX 0.5 % ophthalmic suspension, Apply 1 drop to eye(s) as directed by provider 4 (Four) Times a Day., Disp: 3 each, Rfl: 1  •  lovastatin (MEVACOR) 10 MG tablet, Take 1 tablet by mouth Daily., Disp: 90 tablet, Rfl: 0  •  spironolactone (ALDACTONE) 50 MG tablet, TAKE 1 TABLET BY MOUTH DAILY, Disp: 90 tablet, Rfl: 2  •  valsartan (Diovan) 320 MG tablet, Take 1 tablet by mouth Daily., Disp: 30 tablet, Rfl: 5  •  dilTIAZem CD (CARDIZEM CD) 180 MG 24 hr capsule, Take 1 capsule by mouth Daily., Disp: 90 capsule, Rfl: 1  Allergies   Allergen Reactions   • Penicillins Hives   • Sulfa Antibiotics Hives   • Sulfasalazine Unknown (See Comments)       Patient's Body mass index is 21.63 kg/m². indicating that she is within normal range (BMI 18.5-24.9). No BMI management plan needed..      Past Medical History:   Diagnosis Date   • Atrial fibrillation (HCC)    • Cancer (HCC)    • Cholelithiasis    • Colon polyp    • Hyperlipidemia    • Hypertension      Past Surgical History:   Procedure Laterality Date   • APPENDECTOMY     • BREAST SURGERY     • COLONOSCOPY  10/2015    Polyps, fair prep, recall 1 year   • COLONOSCOPY      10 yr ago, Dr. Valiente   • HYSTERECTOMY     • MASTECTOMY      Left       Review of Systems   Constitutional: Negative.    HENT:  "Negative.    Eyes: Negative.    Respiratory: Negative.    Cardiovascular: Negative.    Gastrointestinal: Negative.    Endocrine: Negative.    Genitourinary: Negative.    Musculoskeletal: Negative.    Skin: Negative.    Allergic/Immunologic: Negative.    Neurological: Negative.    Hematological: Negative.    Psychiatric/Behavioral: Negative.        Objective  /72 (BP Location: Right arm, Patient Position: Sitting, Cuff Size: Adult)   Pulse 64   Resp 20   Ht 165.1 cm (65\") Comment: pt reported.  Wt 59 kg (130 lb)   SpO2 99%   Breastfeeding No   BMI 21.63 kg/m²   Physical Exam  Vitals and nursing note reviewed.   Constitutional:       Appearance: Normal appearance. She is normal weight.   HENT:      Head: Normocephalic and atraumatic.      Nose: Nose normal.      Mouth/Throat:      Mouth: Mucous membranes are moist.   Eyes:      Extraocular Movements: Extraocular movements intact.      Conjunctiva/sclera: Conjunctivae normal.      Pupils: Pupils are equal, round, and reactive to light.   Cardiovascular:      Rate and Rhythm: Normal rate and regular rhythm.      Pulses: Normal pulses.      Heart sounds: Normal heart sounds.   Pulmonary:      Effort: Pulmonary effort is normal.      Breath sounds: Normal breath sounds.   Abdominal:      General: Abdomen is flat. Bowel sounds are normal.      Palpations: Abdomen is soft.   Musculoskeletal:         General: Normal range of motion.      Cervical back: Normal range of motion and neck supple.   Skin:     General: Skin is warm and dry.      Capillary Refill: Capillary refill takes less than 2 seconds.   Neurological:      General: No focal deficit present.      Mental Status: She is alert.   Psychiatric:         Mood and Affect: Mood normal.         Assessment/Plan   Diagnoses and all orders for this visit:    1. Essential hypertension (Primary)  -     CBC & Differential  -     Comprehensive metabolic panel    2. Persistent atrial fibrillation (HCC)  -     CBC & " Differential  -     Comprehensive metabolic panel      She will monitor bp and keep me informd  She will keep appt with cardiology.           Orders Placed This Encounter   Procedures   • Comprehensive metabolic panel     Order Specific Question:   Release to patient     Answer:   Immediate   • CBC & Differential       Follow up: 6 month(s)

## 2022-03-15 ENCOUNTER — LAB (OUTPATIENT)
Dept: FAMILY MEDICINE CLINIC | Facility: CLINIC | Age: 87
End: 2022-03-15

## 2022-03-16 LAB
ALBUMIN SERPL-MCNC: 4.1 G/DL (ref 3.5–5.2)
ALBUMIN/GLOB SERPL: 1.5 G/DL
ALP SERPL-CCNC: 68 U/L (ref 39–117)
ALT SERPL-CCNC: 15 U/L (ref 1–33)
AST SERPL-CCNC: 22 U/L (ref 1–32)
BASOPHILS # BLD AUTO: 0.05 10*3/MM3 (ref 0–0.2)
BASOPHILS NFR BLD AUTO: 0.9 % (ref 0–1.5)
BILIRUB SERPL-MCNC: 0.4 MG/DL (ref 0–1.2)
BUN SERPL-MCNC: 20 MG/DL (ref 8–23)
BUN/CREAT SERPL: 30.3 (ref 7–25)
CALCIUM SERPL-MCNC: 9.3 MG/DL (ref 8.6–10.5)
CHLORIDE SERPL-SCNC: 100 MMOL/L (ref 98–107)
CO2 SERPL-SCNC: 33.4 MMOL/L (ref 22–29)
CREAT SERPL-MCNC: 0.66 MG/DL (ref 0.57–1)
EGFRCR SERPLBLD CKD-EPI 2021: 85 ML/MIN/1.73
EOSINOPHIL # BLD AUTO: 0.14 10*3/MM3 (ref 0–0.4)
EOSINOPHIL NFR BLD AUTO: 2.6 % (ref 0.3–6.2)
ERYTHROCYTE [DISTWIDTH] IN BLOOD BY AUTOMATED COUNT: 12.5 % (ref 12.3–15.4)
GLOBULIN SER CALC-MCNC: 2.8 GM/DL
GLUCOSE SERPL-MCNC: 98 MG/DL (ref 65–99)
HCT VFR BLD AUTO: 39.1 % (ref 34–46.6)
HGB BLD-MCNC: 12.9 G/DL (ref 12–15.9)
IMM GRANULOCYTES # BLD AUTO: 0.01 10*3/MM3 (ref 0–0.05)
IMM GRANULOCYTES NFR BLD AUTO: 0.2 % (ref 0–0.5)
LYMPHOCYTES # BLD AUTO: 1.05 10*3/MM3 (ref 0.7–3.1)
LYMPHOCYTES NFR BLD AUTO: 19.5 % (ref 19.6–45.3)
MCH RBC QN AUTO: 31.5 PG (ref 26.6–33)
MCHC RBC AUTO-ENTMCNC: 33 G/DL (ref 31.5–35.7)
MCV RBC AUTO: 95.4 FL (ref 79–97)
MONOCYTES # BLD AUTO: 0.62 10*3/MM3 (ref 0.1–0.9)
MONOCYTES NFR BLD AUTO: 11.5 % (ref 5–12)
NEUTROPHILS # BLD AUTO: 3.51 10*3/MM3 (ref 1.7–7)
NEUTROPHILS NFR BLD AUTO: 65.3 % (ref 42.7–76)
NRBC BLD AUTO-RTO: 0 /100 WBC (ref 0–0.2)
PLATELET # BLD AUTO: 156 10*3/MM3 (ref 140–450)
POTASSIUM SERPL-SCNC: 3.9 MMOL/L (ref 3.5–5.2)
PROT SERPL-MCNC: 6.9 G/DL (ref 6–8.5)
RBC # BLD AUTO: 4.1 10*6/MM3 (ref 3.77–5.28)
SODIUM SERPL-SCNC: 144 MMOL/L (ref 136–145)
WBC # BLD AUTO: 5.38 10*3/MM3 (ref 3.4–10.8)

## 2022-05-03 RX ORDER — HYDRALAZINE HYDROCHLORIDE 25 MG/1
TABLET, FILM COATED ORAL
Qty: 270 TABLET | Refills: 1 | Status: SHIPPED | OUTPATIENT
Start: 2022-05-03 | End: 2022-08-05

## 2022-07-07 ENCOUNTER — TELEPHONE (OUTPATIENT)
Dept: FAMILY MEDICINE CLINIC | Facility: CLINIC | Age: 87
End: 2022-07-07

## 2022-07-07 ENCOUNTER — OFFICE VISIT (OUTPATIENT)
Dept: FAMILY MEDICINE CLINIC | Facility: CLINIC | Age: 87
End: 2022-07-07

## 2022-07-07 VITALS — HEART RATE: 108 BPM | OXYGEN SATURATION: 96 % | SYSTOLIC BLOOD PRESSURE: 138 MMHG | DIASTOLIC BLOOD PRESSURE: 72 MMHG

## 2022-07-07 DIAGNOSIS — R53.81 DEBILITY: Primary | ICD-10-CM

## 2022-07-07 DIAGNOSIS — G89.29 CHRONIC PAIN OF LEFT KNEE: ICD-10-CM

## 2022-07-07 DIAGNOSIS — M25.562 CHRONIC PAIN OF LEFT KNEE: ICD-10-CM

## 2022-07-07 DIAGNOSIS — Z79.01 ANTICOAGULATED: ICD-10-CM

## 2022-07-07 DIAGNOSIS — I48.19 PERSISTENT ATRIAL FIBRILLATION: ICD-10-CM

## 2022-07-07 DIAGNOSIS — Z78.9 DEFICIT IN ACTIVITIES OF DAILY LIVING (ADL): ICD-10-CM

## 2022-07-07 DIAGNOSIS — E78.2 MIXED HYPERLIPIDEMIA: ICD-10-CM

## 2022-07-07 DIAGNOSIS — I10 ESSENTIAL HYPERTENSION: ICD-10-CM

## 2022-07-07 PROCEDURE — 96372 THER/PROPH/DIAG INJ SC/IM: CPT | Performed by: NURSE PRACTITIONER

## 2022-07-07 PROCEDURE — 99214 OFFICE O/P EST MOD 30 MIN: CPT | Performed by: NURSE PRACTITIONER

## 2022-07-07 RX ORDER — DEXAMETHASONE SODIUM PHOSPHATE 4 MG/ML
4 INJECTION, SOLUTION INTRA-ARTICULAR; INTRALESIONAL; INTRAMUSCULAR; INTRAVENOUS; SOFT TISSUE ONCE
Status: COMPLETED | OUTPATIENT
Start: 2022-07-07 | End: 2022-07-07

## 2022-07-07 RX ORDER — METHYLPREDNISOLONE 4 MG/1
TABLET ORAL 2 TIMES DAILY
COMMUNITY

## 2022-07-07 RX ADMIN — DEXAMETHASONE SODIUM PHOSPHATE 4 MG: 4 INJECTION, SOLUTION INTRA-ARTICULAR; INTRALESIONAL; INTRAMUSCULAR; INTRAVENOUS; SOFT TISSUE at 12:15

## 2022-07-07 NOTE — PROGRESS NOTES
Subjective   Chief Complaint:  Decline in function    History of Present Illness:  This 88 y.o. female was seen in the office today.  She presents today after going to the ED at [facility] for knee pain.  Her caregiver that is with her today says that she cannot hardly walk and has had a decline in ADLs and functioning in the home.  They have determined that it is not safe for her to go home.  She has had a flareup of left knee pain.  She was given a an order for Medrol Dosepak at the ER.    Allergies   Allergen Reactions   • Penicillins Hives   • Sulfa Antibiotics Hives   • Sulfasalazine Unknown (See Comments)      Current Outpatient Medications on File Prior to Visit   Medication Sig   • Calcium Citrate-Vitamin D (CALCIUM + D PO) Take 500 mg by mouth daily.   • carvedilol (COREG) 25 MG tablet TAKE 1 TABLET BY MOUTH TWICE DAILY WITH MEALS   • dilTIAZem CD (CARDIZEM CD) 180 MG 24 hr capsule Take 1 capsule by mouth Daily.   • Eliquis 5 MG tablet tablet TAKE 1 TABLET BY MOUTH EVERY 12 HOURS   • hydrALAZINE (APRESOLINE) 25 MG tablet TAKE 1 TABLET BY MOUTH THREE TIMES DAILY   • LOTEMAX 0.5 % ophthalmic suspension Apply 1 drop to eye(s) as directed by provider 4 (Four) Times a Day.   • lovastatin (MEVACOR) 10 MG tablet Take 1 tablet by mouth Daily.   • methylPREDNISolone (MEDROL) 4 MG dose pack Take  by mouth 2 (Two) Times a Day. Take as directed on package instructions.   • spironolactone (ALDACTONE) 50 MG tablet TAKE 1 TABLET BY MOUTH DAILY   • valsartan (Diovan) 320 MG tablet Take 1 tablet by mouth Daily.     No current facility-administered medications on file prior to visit.      Past Medical, Surgical, Social, and Family History:  Past Medical History:   Diagnosis Date   • Atrial fibrillation (HCC)    • Cancer (HCC)    • Cholelithiasis    • Colon polyp    • Hyperlipidemia    • Hypertension      Past Surgical History:   Procedure Laterality Date   • APPENDECTOMY     • BREAST SURGERY     • COLONOSCOPY  10/2015    Polyps,  fair prep, recall 1 year   • COLONOSCOPY      10 yr ago, Dr. Valiente   • HYSTERECTOMY     • MASTECTOMY      Left     Social History     Socioeconomic History   • Marital status:    Tobacco Use   • Smoking status: Never Smoker   • Smokeless tobacco: Never Used   Substance and Sexual Activity   • Alcohol use: No   • Drug use: No   • Sexual activity: Never     Family History   Problem Relation Age of Onset   • No Known Problems Mother    • No Known Problems Father      Objective   Physical Exam  Vitals reviewed.   Constitutional:       General: She is not in acute distress.     Appearance: Normal appearance.   Cardiovascular:      Rate and Rhythm: Normal rate and regular rhythm.   Pulmonary:      Effort: Pulmonary effort is normal.      Breath sounds: Normal breath sounds.   Musculoskeletal:         General: Swelling and tenderness (*Left knee) present.      Comments: Poor mobility, difficulty with transfers, lower extremity strength present.  Poor push and pull strength, poor core strength.     /72   Pulse 108   SpO2 96%     Assessment & Plan   Diagnoses and all orders for this visit:    1. Debility (Primary)    2. Chronic pain of left knee  -     Ambulatory Referral to Orthopedic Surgery  -     dexamethasone (DECADRON) injection 4 mg    3. Deficit in activities of daily living (ADL)    4. Mixed hyperlipidemia    5. Essential hypertension    6. Persistent atrial fibrillation (HCC)    7. Anticoagulated    Discussion:  Advised and educated plan of care.      Decadron 4 mg IM given today-advised to start the pack tomorrow  Admit to [facility] for rehab  Diet: As tolerated  Activity: Full weightbearing, PT OT speech to evaluate and treat as indicated-assistive devices as appropriate and assessed by PT/OT  Medications:  -Calcium with vitamin D 500 mg p.o. daily  -Coreg 25 mg p.o. twice a day  -Cardizem CD1 180 mg p.o. daily  -Eliquis 5 mg p.o. twice a day  -Hydralazine 25 mg  p.o. 3 times a day  -Mevacor 10 mg p.o. daily  -Medrol Dosepak take as directed  -Aldactone 50 mg p.o. daily  -Lotemax 0.5% ophthalmic suspension 1 drop 4 times a day  -Diovan 320 mg p.o. daily  Referral to orthopedic surgery sent to evaluate her left knee    BMI is within normal parameters. No other follow-up for BMI required.    Follow-up:  Return for no f/u needed at this time- will see at [facility] if admitted.    Electronically signed by VIAN Joshua, 07/07/22, 11:57 AM CDT.

## 2022-07-07 NOTE — PROGRESS NOTES
Pt here for Decadron 4mg injection as ordered. Given in Right Deltoid . Pt tolerated well and no adverse reactions noted and pt left office.

## 2022-07-14 ENCOUNTER — NURSING HOME (OUTPATIENT)
Dept: FAMILY MEDICINE CLINIC | Facility: CLINIC | Age: 87
End: 2022-07-14

## 2022-07-14 DIAGNOSIS — M25.562 CHRONIC PAIN OF LEFT KNEE: ICD-10-CM

## 2022-07-14 DIAGNOSIS — R53.81 DEBILITY: ICD-10-CM

## 2022-07-14 DIAGNOSIS — Z78.9 DEFICIT IN ACTIVITIES OF DAILY LIVING (ADL): ICD-10-CM

## 2022-07-14 DIAGNOSIS — G89.29 CHRONIC PAIN OF LEFT KNEE: ICD-10-CM

## 2022-07-14 DIAGNOSIS — Z79.01 ANTICOAGULATED: ICD-10-CM

## 2022-07-14 DIAGNOSIS — E78.2 MIXED HYPERLIPIDEMIA: ICD-10-CM

## 2022-07-14 DIAGNOSIS — I10 ESSENTIAL HYPERTENSION: ICD-10-CM

## 2022-07-14 PROCEDURE — 99309 SBSQ NF CARE MODERATE MDM 30: CPT | Performed by: NURSE PRACTITIONER

## 2022-07-18 ENCOUNTER — TELEPHONE (OUTPATIENT)
Dept: FAMILY MEDICINE CLINIC | Facility: CLINIC | Age: 87
End: 2022-07-18

## 2022-07-18 NOTE — TELEPHONE ENCOUNTER
Anita from [facility] called and stated before patient came into nursing home patient received knee injection and steroid pack, She stated patient was doing well while on steroid pack with therapy and walking but since finishing patient has rapidly declined and would like to know what you would like to do

## 2022-08-05 RX ORDER — HYDRALAZINE HYDROCHLORIDE 25 MG/1
TABLET, FILM COATED ORAL
Qty: 270 TABLET | Refills: 1 | Status: CANCELLED | OUTPATIENT
Start: 2022-08-05

## 2022-08-05 RX ORDER — HYDRALAZINE HYDROCHLORIDE 25 MG/1
TABLET, FILM COATED ORAL
Qty: 270 TABLET | Refills: 1 | Status: SHIPPED | OUTPATIENT
Start: 2022-08-05

## 2022-08-05 RX ORDER — APIXABAN 5 MG/1
TABLET, FILM COATED ORAL
Qty: 180 TABLET | Refills: 1 | Status: SHIPPED | OUTPATIENT
Start: 2022-08-05

## 2022-08-05 NOTE — TELEPHONE ENCOUNTER
Rx Refill Note  Requested Prescriptions     Pending Prescriptions Disp Refills   • Eliquis 5 MG tablet tablet [Pharmacy Med Name: ELIQUIS 5MG TABLETS] 180 tablet 1     Sig: TAKE 1 TABLET BY MOUTH EVERY 12 HOURS   • hydrALAZINE (APRESOLINE) 25 MG tablet [Pharmacy Med Name: HYDRALAZINE  25MG TABLETS(ORANGE)] 270 tablet 1     Sig: TAKE 1 TABLET BY MOUTH THREE TIMES DAILY      Last office visit with prescribing clinician: 3/11/2022      Next office visit with prescribing clinician: 9/14/2022            Rosina Jackson, PRECIOUS  08/05/22, 15:20 CDT

## 2022-08-18 ENCOUNTER — NURSING HOME (OUTPATIENT)
Dept: FAMILY MEDICINE CLINIC | Facility: CLINIC | Age: 87
End: 2022-08-18

## 2022-08-18 DIAGNOSIS — J30.89 NON-SEASONAL ALLERGIC RHINITIS, UNSPECIFIED TRIGGER: ICD-10-CM

## 2022-08-18 DIAGNOSIS — R53.81 DEBILITY: ICD-10-CM

## 2022-08-18 PROCEDURE — 99308 SBSQ NF CARE LOW MDM 20: CPT | Performed by: NURSE PRACTITIONER

## 2022-08-18 NOTE — PROGRESS NOTES
"Progress Note      Bin Osorio, APRN  [address] [address]  Phone: [phone]  Fax: [phone]      PATIENT NAME: Devora Grant                                                                          YOB: 1934            DATE OF SERVICE: 08/18/2022  FAC[facility]: [facility]     CHIEF COMPLAINT:  Regular nursing facility visit      HISTORY OF PRESENT ILLNESS:   This 88 y.o. female was seen at the nursing facility today for routine rounding review of acute and chronic problems.      Continues care at the nursing facility.  Reports has settled in well, only health complaint today is reporting having a \"summer cold\".  Reports COVID test have subsequently been negative.  Reports just having some clear throat drainage and a slight cough.    PAST MEDICAL & SURGICAL HISTORY:   Past Medical History:   Diagnosis Date   • Atrial fibrillation (HCC)    • Cancer (HCC)    • Cholelithiasis    • Colon polyp    • Hyperlipidemia    • Hypertension       Past Surgical History:   Procedure Laterality Date   • APPENDECTOMY     • BREAST SURGERY     • COLONOSCOPY  10/2015    Polyps, fair prep, recall 1 year   • COLONOSCOPY      10 yr ago, Dr. Valiente   • HYSTERECTOMY     • MASTECTOMY      Left        MEDICATIONS:  Full medication list from facility reviewed this visit.    ALLERGIES:  Allergies   Allergen Reactions   • Penicillins Hives   • Sulfa Antibiotics Hives   • Sulfasalazine Unknown (See Comments)       SOCIAL HISTORY:  Social History     Socioeconomic History   • Marital status:    Tobacco Use   • Smoking status: Never Smoker   • Smokeless tobacco: Never Used   Substance and Sexual Activity   • Alcohol use: No   • Drug use: No   • Sexual activity: Never     FAMILY HISTORY:  Family History   Problem Relation Age of Onset   • No Known Problems Mother    • No Known Problems Father      REVIEW OF SYSTEMS:  Review of Systems   Constitutional: Negative for appetite " change, chills and fever.   HENT: Positive for postnasal drip and rhinorrhea. Negative for sinus pressure, sneezing and sore throat.    Respiratory: Positive for cough. Negative for shortness of breath.    Cardiovascular: Negative for chest pain and palpitations.   Musculoskeletal: Positive for gait problem. Negative for arthralgias and myalgias.     PHYSICAL EXAMINATION:   VITAL SIGNS: Temperature 97.8 pulse 78 respiratory rate 20 blood pressure 124/75 O2 saturation 97%  Physical Exam  Vitals reviewed.   Constitutional:       General: She is not in acute distress.     Appearance: Normal appearance.   HENT:      Nose: Rhinorrhea (*Clear rhinorrhea with postnasal drip-cobblestone posterior tongue) present.   Cardiovascular:      Rate and Rhythm: Normal rate and regular rhythm.   Pulmonary:      Effort: Pulmonary effort is normal.      Breath sounds: Normal breath sounds.   Musculoskeletal:      Comments: Lower extremities weak-chronic mobility deficits present       RECORDS REVIEW:   I have reviewed and interpreted any labs, xrays, and diagnostic tests available today.    ASSESSMENT   Diagnoses and all orders for this visit:    1. Nursing home resident (Primary)    2. Debility    3. Non-seasonal allergic rhinitis, unspecified trigger    PLAN  1.  Discussed Patient in detail with nursing/staff, addressed all needs today.     2.  Plan of Care Reviewed   3.  PT/OT/SLP Reviewed  4.  Order Changes  -Xyzal 5 mg p.o. every p.m.  5.  Discharge Plans Reviewed - No immediate plan to discharge.  6.  Advance Directive on file with nursing facility.   7.  POA on file with nursing facility.   8.  Code Status on facility chart reviewed during encounter.  9.  Review labs, xrays, diagnostics in realtime as office receives results.    Note e-Signed by IVAN Joshua on 08/18/2022 at 12:47 CDT

## 2022-09-23 RX ORDER — CARVEDILOL 25 MG/1
TABLET ORAL
Qty: 180 TABLET | Refills: 1 | Status: SHIPPED | OUTPATIENT
Start: 2022-09-23

## 2022-10-06 ENCOUNTER — TELEPHONE (OUTPATIENT)
Dept: FAMILY MEDICINE CLINIC | Facility: CLINIC | Age: 87
End: 2022-10-06

## 2022-10-06 NOTE — TELEPHONE ENCOUNTER
Patient POA came in with letter stating that patient has two severely handicap daughters and both had egg allergies. Devora has eaten eggs all her life. The last time devora took a flu shot several years ago she became very sick with flu like symptoms. Devora is now at [facility]. Should she take the flu shot.     Oumar Haskins   [phone]

## 2022-10-12 ENCOUNTER — NURSING HOME (OUTPATIENT)
Dept: FAMILY MEDICINE CLINIC | Facility: CLINIC | Age: 87
End: 2022-10-12

## 2022-10-12 DIAGNOSIS — L60.8 NAIL DEFORMITY: ICD-10-CM

## 2022-10-12 DIAGNOSIS — R53.81 DEBILITY: ICD-10-CM

## 2022-10-12 DIAGNOSIS — E78.2 MIXED HYPERLIPIDEMIA: ICD-10-CM

## 2022-10-12 DIAGNOSIS — I48.19 PERSISTENT ATRIAL FIBRILLATION: ICD-10-CM

## 2022-10-12 DIAGNOSIS — I10 ESSENTIAL HYPERTENSION: ICD-10-CM

## 2022-10-12 PROCEDURE — 99308 SBSQ NF CARE LOW MDM 20: CPT | Performed by: NURSE PRACTITIONER

## 2022-10-12 NOTE — PROGRESS NOTES
Progress Note      Bin Osorio, APRN  [address] [address]  Phone: [phone]  Fax: [phone]      PATIENT NAME: Devora Grant                                                                          YOB: 1934            DATE OF SERVICE: 10/12/2022  FACILITY: [facility]     CHIEF COMPLAINT:  Regular nursing facility visit      HISTORY OF PRESENT ILLNESS:   This 88 y.o. female was seen at the nursing facility today for routine rounding review of acute and chronic problems.      She is seen today for the regular facility review.  I did inquire why she was not on my list on the last visit because I did seen her in August 18, 2022-she reports she was seen by the medical director last inadvertently-I verified this with the facility-either way, she is back on our list and we are following.  Her only complaint today is that she has a very thick deformed left #1 toe.  She does not was see the podiatrist in town-the facility podiatrist does not come for 1-3 months.  She has underlying hypertension A. fib-she is anticoagulated-blood pressure trends reviewed and satisfactory.  She does have a chronic care lab order present-she is a DNR.    PAST MEDICAL & SURGICAL HISTORY:   Past Medical History:   Diagnosis Date   • Atrial fibrillation (HCC)    • Cancer (HCC)    • Cholelithiasis    • Colon polyp    • Hyperlipidemia    • Hypertension       Past Surgical History:   Procedure Laterality Date   • APPENDECTOMY     • BREAST SURGERY     • COLONOSCOPY  10/2015    Polyps, fair prep, recall 1 year   • COLONOSCOPY      10 yr ago, Dr. Valiente   • HYSTERECTOMY     • MASTECTOMY      Left        MEDICATIONS:  Full medication list from facility reviewed this visit.    ALLERGIES:  Allergies   Allergen Reactions   • Penicillins Hives   • Sulfa Antibiotics Hives   • Sulfasalazine Unknown (See Comments)       SOCIAL HISTORY:  Social History     Socioeconomic History   • Marital  status:    Tobacco Use   • Smoking status: Never   • Smokeless tobacco: Never   Substance and Sexual Activity   • Alcohol use: No   • Drug use: No   • Sexual activity: Never     FAMILY HISTORY:  Family History   Problem Relation Age of Onset   • No Known Problems Mother    • No Known Problems Father      REVIEW OF SYSTEMS:  Review of Systems   Constitutional: Negative for appetite change, chills and fever.   Respiratory: Negative for cough and shortness of breath.    Cardiovascular: Negative for chest pain and palpitations.   Musculoskeletal: Negative for arthralgias and myalgias.   Skin: Negative for dry skin and bruise.        Reports mild soreness and discomfort to the left #1 toenail     PHYSICAL EXAMINATION:   VITAL SIGNS: Temperature 98.6 pulse 76 respiratory rate 20 blood pressure 116/64 weight 155.2 pounds  Physical Exam  Vitals reviewed.   Constitutional:       General: She is not in acute distress.     Appearance: Normal appearance.   Cardiovascular:      Rate and Rhythm: Normal rate and regular rhythm.   Pulmonary:      Effort: Pulmonary effort is normal.      Breath sounds: Normal breath sounds.   Feet:      Comments: Left #1 toenail thick, yellow, slightly loose      RECORDS REVIEW:   I have reviewed and interpreted any labs, xrays, and diagnostic tests available today.    ASSESSMENT   Diagnoses and all orders for this visit:    1. Nursing home resident (Primary)    2. Nail deformity    3. Debility    4. Mixed hyperlipidemia    5. Essential hypertension    6. Persistent atrial fibrillation (HCC)    PLAN  1.  Discussed Patient in detail with nursing/staff, addressed all needs today.     2.  Plan of Care Reviewed   3.  PT/OT/SLP Reviewed  4.  Order Changes  -Offered to do a referral to podiatry through our office-would be able to get her in sooner than 3 months.  She declined and wants to not leave the facility if all possible and see the in-house podiatrist.  Advised her if she changes her mind to  let me know.  5.  Discharge Plans Reviewed - No immediate plan to discharge.  6.  Advance Directive on file with nursing facility.   7.  POA on file with nursing facility.   8.  Code Status on facility chart reviewed during encounter.  9.  Review labs, xrays, diagnostics in realtime as office receives results.    Note e-Signed by IVAN Joshua on 10/12/2022 at 14:59 CDT

## 2022-11-09 ENCOUNTER — NURSING HOME (OUTPATIENT)
Dept: FAMILY MEDICINE CLINIC | Facility: CLINIC | Age: 87
End: 2022-11-09

## 2022-11-09 DIAGNOSIS — R53.81 DEBILITY: ICD-10-CM

## 2022-11-09 DIAGNOSIS — L60.8 NAIL DEFORMITY: ICD-10-CM

## 2022-11-09 PROCEDURE — 99308 SBSQ NF CARE LOW MDM 20: CPT | Performed by: NURSE PRACTITIONER

## 2022-11-10 NOTE — PROGRESS NOTES
Progress Note      Bin Osorio, APRN  [address] [address]  Phone: [phone]  Fax: [phone]      PATIENT NAME: Devora Grant                                                                          YOB: 1934            DATE OF SERVICE: 11/09/2022  FACILITY: [facility]     CHIEF COMPLAINT:  Regular nursing facility visit      HISTORY OF PRESENT ILLNESS:   This 88 y.o. female was seen at the nursing facility today for routine rounding review of acute and chronic problems.      The patient is seen today for her regular nursing facility visit. She reports the podiatry issues we discussed in the last visit will finally be resolved tomorrow. She reports seeing the podiatrist tomorrow.    She reports no new issues today and vital signs are as followed. Her temperature is 98 degrees Fahrenheit, pulse is 76 bpm, respiratory rate is 20, blood pressure is 112/58 mmHg.    PAST MEDICAL & SURGICAL HISTORY:   Past Medical History:   Diagnosis Date   • Atrial fibrillation (HCC)    • Cancer (HCC)    • Cholelithiasis    • Colon polyp    • Hyperlipidemia    • Hypertension       Past Surgical History:   Procedure Laterality Date   • APPENDECTOMY     • BREAST SURGERY     • COLONOSCOPY  10/2015    Polyps, fair prep, recall 1 year   • COLONOSCOPY      10 yr ago, Dr. Valiente   • HYSTERECTOMY     • MASTECTOMY      Left        MEDICATIONS:  Full medication list from facility reviewed this visit.    ALLERGIES:  Allergies   Allergen Reactions   • Penicillins Hives   • Sulfa Antibiotics Hives   • Sulfasalazine Unknown (See Comments)       SOCIAL HISTORY:  Social History     Socioeconomic History   • Marital status:    Tobacco Use   • Smoking status: Never   • Smokeless tobacco: Never   Substance and Sexual Activity   • Alcohol use: No   • Drug use: No   • Sexual activity: Never     FAMILY HISTORY:  Family History   Problem Relation Age of Onset   • No Known Problems  Mother    • No Known Problems Father      REVIEW OF SYSTEMS:  Review of Systems   Constitutional: Negative for appetite change, chills and fever.   Respiratory: Negative for cough and shortness of breath.    Cardiovascular: Negative for chest pain and palpitations.   Musculoskeletal: Negative for arthralgias and myalgias.     PHYSICAL EXAMINATION:   VITAL SIGNS: Her temperature is 98 degrees Fahrenheit, pulse is 76 bpm, respiratory rate is 20, blood pressure is 112/58 mmHg.  Physical Exam  Vitals reviewed.   Constitutional:       General: She is not in acute distress.     Appearance: Normal appearance.   Cardiovascular:      Rate and Rhythm: Normal rate and regular rhythm.   Pulmonary:      Effort: Pulmonary effort is normal.      Breath sounds: Normal breath sounds.   Feet:      Comments: Left #1 toenail is yellow, discolored, loose and thick.  Skin:     Comments: She reports thick deformed toenails.       RECORDS REVIEW:   I have reviewed and interpreted any labs, xrays, and diagnostic tests available today.    ASSESSMENT   Diagnoses and all orders for this visit:    1. Nursing home resident (Primary)    2. Nail deformity    3. Debility    PLAN    1.  Discussed Patient in detail with nursing/staff, addressed all needs today.     2.  Plan of Care Reviewed   3.  PT/OT/SLP Reviewed  4.  Order Changes  -No new orders  5.  Discharge Plans Reviewed - No immediate plan to discharge.  6.  Advance Directive on file with nursing facility.   7.  POA on file with nursing facility.   8.  Code Status on facility chart reviewed during encounter.  9.  Review labs, xrays, diagnostics in realtime as office receives results.    Transcribed from ambient dictation for IVAN Joshua by Parth Luna.  11/10/22   10:11 CST    I have personally performed the services described in this document as transcribed by the above individual, and it is both accurate and complete.

## 2022-12-14 ENCOUNTER — NURSING HOME (OUTPATIENT)
Dept: FAMILY MEDICINE CLINIC | Facility: CLINIC | Age: 87
End: 2022-12-14

## 2022-12-14 DIAGNOSIS — I10 ESSENTIAL HYPERTENSION: ICD-10-CM

## 2022-12-14 DIAGNOSIS — I48.19 PERSISTENT ATRIAL FIBRILLATION: ICD-10-CM

## 2022-12-14 DIAGNOSIS — R53.81 DEBILITY: ICD-10-CM

## 2022-12-14 DIAGNOSIS — E78.2 MIXED HYPERLIPIDEMIA: ICD-10-CM

## 2022-12-14 PROCEDURE — 99308 SBSQ NF CARE LOW MDM 20: CPT | Performed by: NURSE PRACTITIONER

## 2022-12-15 NOTE — PROGRESS NOTES
Progress Note      Bin Osorio, APRN  [address] [address]  Phone: [phone]  Fax: [phone]      PATIENT NAME: Devora Grant                                                                          YOB: 1934            DATE OF SERVICE: 12/14/2022  FACILITY: [facility]     CHIEF COMPLAINT:  Regular nursing facility visit      HISTORY OF PRESENT ILLNESS:   This 88 y.o. female was seen at the nursing facility today for routine rounding review of acute and chronic problems.      The patient reports she had a cough last week, but it has resolved. She denies any fever, chills, or night sweats. She reports overall feeling well. She denies any problems with her medication. The facility is reporting no new concerns today.    PAST MEDICAL & SURGICAL HISTORY:   Past Medical History:   Diagnosis Date   • Atrial fibrillation (HCC)    • Cancer (HCC)    • Cholelithiasis    • Colon polyp    • Hyperlipidemia    • Hypertension       Past Surgical History:   Procedure Laterality Date   • APPENDECTOMY     • BREAST SURGERY     • COLONOSCOPY  10/2015    Polyps, fair prep, recall 1 year   • COLONOSCOPY      10 yr ago, Dr. Valiente   • HYSTERECTOMY     • MASTECTOMY      Left        MEDICATIONS:  Full medication list from facility reviewed this visit.    ALLERGIES:  Allergies   Allergen Reactions   • Penicillins Hives   • Sulfa Antibiotics Hives   • Sulfasalazine Unknown (See Comments)       SOCIAL HISTORY:  Social History     Socioeconomic History   • Marital status:    Tobacco Use   • Smoking status: Never   • Smokeless tobacco: Never   Substance and Sexual Activity   • Alcohol use: No   • Drug use: No   • Sexual activity: Never     FAMILY HISTORY:  Family History   Problem Relation Age of Onset   • No Known Problems Mother    • No Known Problems Father      REVIEW OF SYSTEMS:  Review of Systems   Constitutional: Negative for appetite change, chills and fever.    Respiratory: Negative for cough and shortness of breath.    Cardiovascular: Negative for chest pain and palpitations.   Musculoskeletal: Negative for arthralgias and myalgias.     PHYSICAL EXAMINATION:   VITAL SIGNS:   Temperature: 97.8 degrees Fahrenheit.  Pulse: 76 bpm.  Respiratory rate: 20 bpm.  Blood pressure: 120/76 mmHg.  Physical Exam  Vitals reviewed.   Constitutional:       General: She is not in acute distress.     Appearance: Normal appearance.   Cardiovascular:      Rate and Rhythm: Normal rate and regular rhythm.   Pulmonary:      Effort: Pulmonary effort is normal.      Breath sounds: Normal breath sounds.       RECORDS REVIEW:   I have reviewed and interpreted any labs, xrays, and diagnostic tests available today.    ASSESSMENT   Diagnoses and all orders for this visit:    1. Nursing home resident (Primary)    2. Debility    3. Essential hypertension    4. Persistent atrial fibrillation (HCC)    5. Mixed hyperlipidemia    PLAN  1.  Discussed Patient in detail with nursing/staff, addressed all needs today.     2.  Plan of Care Reviewed   3.  PT/OT/SLP Reviewed  4.  Order Changes  - No new orders  5.  Discharge Plans Reviewed - No immediate plan to discharge.  6.  Advance Directive on file with nursing facility.   7.  POA on file with nursing facility.   8.  Code Status on facility chart reviewed during encounter.  9.  Review labs, xrays, diagnostics in realtime as office receives results.    Note e-Signed by Bin Osorio on 12/14/2022 at 10:05 CST

## 2023-01-11 ENCOUNTER — NURSING HOME (OUTPATIENT)
Dept: FAMILY MEDICINE CLINIC | Facility: CLINIC | Age: 88
End: 2023-01-11
Payer: MEDICARE

## 2023-01-11 DIAGNOSIS — I10 ESSENTIAL HYPERTENSION: ICD-10-CM

## 2023-01-11 DIAGNOSIS — R53.81 DEBILITY: ICD-10-CM

## 2023-01-11 PROCEDURE — 99308 SBSQ NF CARE LOW MDM 20: CPT | Performed by: NURSE PRACTITIONER

## 2023-01-12 NOTE — PROGRESS NOTES
History and Physical Progress Note      Bin Osorio, APRN  [address] [address]  Phone: [phone]  Fax: [phone]      PATIENT NAME: Devora Grant                                                                          YOB: 1934            DATE OF SERVICE: 01/11/2023  FACILITY: [facility] [place]     CHIEF COMPLAINT:  Regular nursing facility visit      HISTORY OF PRESENT ILLNESS:   This 88 y.o. female was seen at the nursing facility today for routine rounding review of acute and chronic problems.    Ms. Grant reports no new concerns today. She reports she is overall feeling well.    PAST MEDICAL & SURGICAL HISTORY:   Past Medical History:   Diagnosis Date   • Atrial fibrillation (HCC)    • Cancer (HCC)    • Cholelithiasis    • Colon polyp    • Hyperlipidemia    • Hypertension       Past Surgical History:   Procedure Laterality Date   • APPENDECTOMY     • BREAST SURGERY     • COLONOSCOPY  10/2015    Polyps, fair prep, recall 1 year   • COLONOSCOPY      10 yr ago, Dr. Valiente   • HYSTERECTOMY     • MASTECTOMY      Left        MEDICATIONS:  Full medication list from facility reviewed this visit.    ALLERGIES:  Allergies   Allergen Reactions   • Penicillins Hives   • Sulfa Antibiotics Hives   • Sulfasalazine Unknown (See Comments)       SOCIAL HISTORY:  Social History     Socioeconomic History   • Marital status:    Tobacco Use   • Smoking status: Never   • Smokeless tobacco: Never   Substance and Sexual Activity   • Alcohol use: No   • Drug use: No   • Sexual activity: Never     FAMILY HISTORY:  Family History   Problem Relation Age of Onset   • No Known Problems Mother    • No Known Problems Father      REVIEW OF SYSTEMS:  Review of Systems   Constitutional: Negative for appetite change, chills and fever.   Respiratory: Negative for cough and shortness of breath.    Cardiovascular: Negative for chest pain and palpitations.   Musculoskeletal:  Negative for arthralgias and myalgias.     PHYSICAL EXAMINATION:   VITAL SIGNS: blood pressure 120/60 mmHg, temperature 97.2 degrees fahrenheit , pulse 78 bpm, respiratory rate 18.  Physical Exam  Vitals reviewed.   Constitutional:       General: She is not in acute distress.     Appearance: Normal appearance.   Cardiovascular:      Rate and Rhythm: Normal rate and regular rhythm.   Pulmonary:      Effort: Pulmonary effort is normal.      Breath sounds: Normal breath sounds.       RECORDS REVIEW:   I have reviewed and interpreted any labs, xrays, and diagnostic tests available today.    ASSESSMENT   Diagnoses and all orders for this visit:    1. Nursing home resident (Primary)    2. Debility    3. Essential hypertension    PLAN    1.  Discussed Patient in detail with nursing/staff, addressed all needs today.  2.  Plan of Care Reviewed   3.  PT/OT/SLP Reviewed  4.  Order Changes  -None  5.  Discharge Plans Reviewed - No immediate plan to discharge.  6.  Advance Directive on file with nursing facility.   7.  POA on file with nursing facility.   8.  Code Status on facility chart reviewed during encounter.  9.  Review labs, xrays, diagnostics in realtime as office receives results.    Transcribed from ambient dictation for IVAN Joshua by Naty Leonardo.  01/12/23   14:04 CST    I have personally performed the services described in this document as transcribed by the above individual, and it is both accurate and complete.

## 2023-01-19 ENCOUNTER — TELEPHONE (OUTPATIENT)
Dept: FAMILY MEDICINE CLINIC | Facility: CLINIC | Age: 88
End: 2023-01-19
Payer: MEDICARE

## 2023-01-19 NOTE — TELEPHONE ENCOUNTER
CBC CMP UA AnD CULTURE---POST VOID CATH FOR RESIDUAL OR BLADDER SCAN AFTER VOID TO CHECK RESIDUAL--CALL TODAY WITH RESULTS PLEASE

## 2023-01-19 NOTE — TELEPHONE ENCOUNTER
Maximino from [facility] called and stated that patient is having increased incontinence. She is wanting to know if they can get labs drawn

## 2023-03-15 ENCOUNTER — NURSING HOME (OUTPATIENT)
Dept: FAMILY MEDICINE CLINIC | Facility: CLINIC | Age: 88
End: 2023-03-15
Payer: MEDICARE

## 2023-03-15 DIAGNOSIS — R53.81 DEBILITY: ICD-10-CM

## 2023-03-15 DIAGNOSIS — E78.2 MIXED HYPERLIPIDEMIA: ICD-10-CM

## 2023-03-15 DIAGNOSIS — I10 ESSENTIAL HYPERTENSION: ICD-10-CM

## 2023-03-15 PROCEDURE — 99308 SBSQ NF CARE LOW MDM 20: CPT | Performed by: NURSE PRACTITIONER

## 2023-03-16 NOTE — PROGRESS NOTES
Progress Note      Bin Osorio, APRN  [address] [address]  Phone: [phone]  Fax: [phone]      PATIENT NAME: Devora Grant                                                                          YOB: 1934            DATE OF SERVICE: 03/15/2023  FACILITY: [facility] [place]     CHIEF COMPLAINT:  Regular nursing facility visit      HISTORY OF PRESENT ILLNESS:   This 88 y.o. female was seen at the nursing facility today for routine rounding review of acute and chronic problems.      She reports overall feeling well. She denies any new problems or concerns. Blood pressure trends were reviewed and satisfactory. Facility reports no problems.    PAST MEDICAL & SURGICAL HISTORY:   Past Medical History:   Diagnosis Date   • Atrial fibrillation (HCC)    • Cancer (HCC)    • Cholelithiasis    • Colon polyp    • Hyperlipidemia    • Hypertension       Past Surgical History:   Procedure Laterality Date   • APPENDECTOMY     • BREAST SURGERY     • COLONOSCOPY  10/2015    Polyps, fair prep, recall 1 year   • COLONOSCOPY      10 yr ago, Dr. Valiente   • HYSTERECTOMY     • MASTECTOMY      Left        MEDICATIONS:  Full medication list from facility reviewed this visit.    ALLERGIES:  Allergies   Allergen Reactions   • Penicillins Hives   • Sulfa Antibiotics Hives   • Sulfasalazine Unknown (See Comments)       SOCIAL HISTORY:  Social History     Socioeconomic History   • Marital status:    Tobacco Use   • Smoking status: Never   • Smokeless tobacco: Never   Substance and Sexual Activity   • Alcohol use: No   • Drug use: No   • Sexual activity: Never     FAMILY HISTORY:  Family History   Problem Relation Age of Onset   • No Known Problems Mother    • No Known Problems Father      REVIEW OF SYSTEMS:  Review of Systems   Constitutional: Negative for appetite change, chills and fever.   Respiratory: Negative for cough and shortness of breath.    Cardiovascular: Negative  for chest pain and palpitations.   Musculoskeletal: Negative for arthralgias and myalgias.     PHYSICAL EXAMINATION:   VITAL SIGNS:   Temperature 97.8 degrees Fahrenheit   Pulse 68 bpm  Respiratory rate 18  Blood pressure 122/68 mmHg  Weight 158.2 pounds  Height 67 inches  O2 saturation 99%    Physical Exam  Vitals reviewed.   Constitutional:       General: She is not in acute distress.     Appearance: Normal appearance.   Cardiovascular:      Rate and Rhythm: Normal rate and regular rhythm.   Pulmonary:      Effort: Pulmonary effort is normal.      Breath sounds: Normal breath sounds.       RECORDS REVIEW:   I have reviewed and interpreted any labs, xrays, and diagnostic tests available today.    ASSESSMENT   Diagnoses and all orders for this visit:    1. Nursing home resident (Primary)    2. Debility    3. Essential hypertension    4. Mixed hyperlipidemia    PLAN    1.  Discussed Patient in detail with nursing/staff, addressed all needs today.  2.  Plan of Care Reviewed   3.  PT/OT/SLP Reviewed  4.  Order Changes  - None  5.  Discharge Plans Reviewed - No immediate plan to discharge.  6.  Advance Directive on file with nursing facility.   7.  POA on file with nursing facility.   8.  Code Status on facility chart reviewed during encounter.  9.  Review labs, xrays, diagnostics in realtime as office receives results.    Transcribed from ambient dictation for IVAN Joshua by Naty Leonardo.  03/15/23   21:36 CDT    I have personally performed the services described in this document as transcribed by the above individual, and it is both accurate and complete.    Electronically signed by IVAN Godfrey 03/15/23, 9:36 PM CDT.

## 2023-04-12 ENCOUNTER — TELEPHONE (OUTPATIENT)
Dept: FAMILY MEDICINE CLINIC | Facility: CLINIC | Age: 88
End: 2023-04-12
Payer: MEDICARE

## 2023-04-12 ENCOUNTER — NURSING HOME (OUTPATIENT)
Dept: FAMILY MEDICINE CLINIC | Facility: CLINIC | Age: 88
End: 2023-04-12
Payer: MEDICARE

## 2023-04-12 DIAGNOSIS — G89.29 CHRONIC PAIN OF BOTH SHOULDERS: ICD-10-CM

## 2023-04-12 DIAGNOSIS — R53.81 DEBILITY: ICD-10-CM

## 2023-04-12 DIAGNOSIS — M25.511 CHRONIC PAIN OF BOTH SHOULDERS: ICD-10-CM

## 2023-04-12 DIAGNOSIS — M25.512 CHRONIC PAIN OF BOTH SHOULDERS: ICD-10-CM

## 2023-04-12 NOTE — TELEPHONE ENCOUNTER
nh called pt is having issues with bilateral shoulder pain and bio freeze and tylenol is not helpng and asked for soemting stronger?

## 2023-04-13 NOTE — PROGRESS NOTES
Progress Note      Bin Osorio, APRN  [address] [address]  Phone: [phone]  Fax: [phone]      PATIENT NAME: Devora Grant                                                                          YOB: 1934            DATE OF SERVICE: 04/12/2023  FACILITY: [facility] [place]     CHIEF COMPLAINT:  Regular nursing facility visit      HISTORY OF PRESENT ILLNESS:   This 88 y.o. female was seen at the nursing facility today for routine rounding review of acute and chronic problems.      The patient is seen today in the nursing facility. She reports chronic bilateral shoulder pain. She reports it has been acutely worse over the last couple of months with increased stiffness and increased difficulty with mobility.    PAST MEDICAL & SURGICAL HISTORY:   Past Medical History:   Diagnosis Date   • Atrial fibrillation    • Cancer    • Cholelithiasis    • Colon polyp    • Hyperlipidemia    • Hypertension       Past Surgical History:   Procedure Laterality Date   • APPENDECTOMY     • BREAST SURGERY     • COLONOSCOPY  10/2015    Polyps, fair prep, recall 1 year   • COLONOSCOPY      10 yr ago, Dr. Valiente   • HYSTERECTOMY     • MASTECTOMY      Left        MEDICATIONS:  Full medication list from facility reviewed this visit.    ALLERGIES:  Allergies   Allergen Reactions   • Penicillins Hives   • Sulfa Antibiotics Hives   • Sulfasalazine Unknown (See Comments)       SOCIAL HISTORY:  Social History     Socioeconomic History   • Marital status:    Tobacco Use   • Smoking status: Never   • Smokeless tobacco: Never   Substance and Sexual Activity   • Alcohol use: No   • Drug use: No   • Sexual activity: Never     FAMILY HISTORY:  Family History   Problem Relation Age of Onset   • No Known Problems Mother    • No Known Problems Father      REVIEW OF SYSTEMS:  Review of Systems   Constitutional: Negative for appetite change, chills and fever.   Respiratory: Negative for  cough and shortness of breath.    Cardiovascular: Negative for chest pain and palpitations.   Musculoskeletal: Positive for arthralgias and gait problem. Negative for myalgias.        Bilateral shoulder pain.     PHYSICAL EXAMINATION:   VITAL SIGNS:     Temperature: 97.6 degrees Fahrenheit.  Pulse: 76 beats per minute.  Respiratory rate: 18 breaths per minute.  Blood pressure: 130/72 mmHg.  Physical Exam  Vitals reviewed.   Constitutional:       General: She is not in acute distress.     Appearance: Normal appearance.   Cardiovascular:      Rate and Rhythm: Normal rate and regular rhythm.   Pulmonary:      Effort: Pulmonary effort is normal.      Breath sounds: Normal breath sounds.   Musculoskeletal:      Right shoulder: Tenderness present.      Left shoulder: Tenderness present.      Comments: Palpable tenderness, bilateral shoulder capsules.       RECORDS REVIEW:   I have reviewed and interpreted any labs, xrays, and diagnostic tests available today.    ASSESSMENT   Diagnoses and all orders for this visit:    1. Nursing home resident (Primary)    2. Debility    3. Chronic pain of both shoulders    PLAN  1.  Discussed Patient in detail with nursing/staff, addressed all needs today.  2.  Plan of Care Reviewed   3.  PT/OT/SLP Reviewed  4.  Order Changes  -1. Bilateral shoulder x-ray.  Discussion: We discussed benefits, risks, alternatives of an intra intra-articular steroid injection. She verbally agreed. She is interested in this.  5.  Discharge Plans Reviewed - No immediate plan to discharge.  6.  Advance Directive on file with nursing facility.   7.  POA on file with nursing facility.   8.  Code Status on facility chart reviewed during encounter.  9.  Review labs, xrays, diagnostics in realtime as office receives results.    Transcribed from ambient dictation for IVAN Joshua by Parth Luna.  04/13/23   08:59 CDT    I have personally performed the services described in this document as transcribed by the  above individual, and it is both accurate and complete.    Electronically signed by Bin Osorio, 04/13/23, 8:59 AM CDT.

## 2023-04-26 ENCOUNTER — NURSING HOME (OUTPATIENT)
Dept: FAMILY MEDICINE CLINIC | Facility: CLINIC | Age: 88
End: 2023-04-26
Payer: MEDICARE

## 2023-04-26 DIAGNOSIS — G89.29 CHRONIC PAIN OF BOTH SHOULDERS: ICD-10-CM

## 2023-04-26 DIAGNOSIS — M25.512 CHRONIC PAIN OF BOTH SHOULDERS: ICD-10-CM

## 2023-04-26 DIAGNOSIS — M25.511 CHRONIC PAIN OF BOTH SHOULDERS: ICD-10-CM

## 2023-04-26 DIAGNOSIS — R53.81 DEBILITY: ICD-10-CM

## 2023-04-26 NOTE — PROGRESS NOTES
Progress Note      Bin Osorio, APRN  [address] [address]  Phone: [phone]  Fax: [phone]      PATIENT NAME: Devora Grant                                                                          YOB: 1934            DATE OF SERVICE: 04/26/2023  FACILITY: [facility] [place]     CHIEF COMPLAINT:  Regular nursing facility visit and follow up of bilateral shoulder pain.      HISTORY OF PRESENT ILLNESS:   This 88 y.o. female was seen at the nursing facility today for routine rounding review of acute and chronic problems.      The patient presents today for follow-up of bilateral shoulder pain. I had offered an orthopedic referral previously. We had discussed at one point me doing the steroid injections myself, but after the initial x-ray, it showed a very narrowed joint space and I advised would rather orthopedics to look at her. She inquires today if she can proceed with the steroid injections and also inquires if therapy may be of benefit. She continues Voltaren gel, which was previously reported as effective. She reports it does not hold her at this point.    PAST MEDICAL & SURGICAL HISTORY:   Past Medical History:   Diagnosis Date   • Atrial fibrillation    • Cancer    • Cholelithiasis    • Colon polyp    • Hyperlipidemia    • Hypertension       Past Surgical History:   Procedure Laterality Date   • APPENDECTOMY     • BREAST SURGERY     • COLONOSCOPY  10/2015    Polyps, fair prep, recall 1 year   • COLONOSCOPY      10 yr ago, Dr. Valiente   • HYSTERECTOMY     • MASTECTOMY      Left        MEDICATIONS:  Full medication list from facility reviewed this visit.    ALLERGIES:  Allergies   Allergen Reactions   • Penicillins Hives   • Sulfa Antibiotics Hives   • Sulfasalazine Unknown (See Comments)       SOCIAL HISTORY:  Social History     Socioeconomic History   • Marital status:    Tobacco Use   • Smoking status: Never   • Smokeless tobacco: Never    Substance and Sexual Activity   • Alcohol use: No   • Drug use: No   • Sexual activity: Never     FAMILY HISTORY:  Family History   Problem Relation Age of Onset   • No Known Problems Mother    • No Known Problems Father      REVIEW OF SYSTEMS:  Review of Systems   Constitutional: Negative for appetite change, chills and fever.   Respiratory: Negative for cough and shortness of breath.    Cardiovascular: Negative for chest pain and palpitations.   Musculoskeletal: Negative for arthralgias and myalgias.        Positive bilateral shoulder pain     PHYSICAL EXAMINATION:   VITAL SIGNS:  Temperature 97.4 degrees Fahrenheit   Pulse 72 bpm  Respiratory rate 18  Blood pressure 125/68 mmHg    Physical Exam  Vitals reviewed.   Constitutional:       General: She is not in acute distress.     Appearance: Normal appearance.   Cardiovascular:      Rate and Rhythm: Normal rate and regular rhythm.   Pulmonary:      Effort: Pulmonary effort is normal.      Breath sounds: Normal breath sounds.   Musculoskeletal:      Comments: Palpable tenderness bilateral shoulders.       RECORDS REVIEW:   I have reviewed and interpreted any labs, xrays, and diagnostic tests available today.    ASSESSMENT   Diagnoses and all orders for this visit:    1. Nursing home resident (Primary)    2. Debility    3. Chronic pain of both shoulders    PLAN    1.  Discussed Patient in detail with nursing/staff, addressed all needs today.  2.  Plan of Care Reviewed   3.  PT/OT/SLP Reviewed  4.  Order Changes  -  Orthopedic referral And the second order I gave was dexamethasone pack 13-day as directed  5.  Discharge Plans Reviewed - No immediate plan to discharge.  6.  Advance Directive on file with nursing facility.   7.  POA on file with nursing facility.   8.  Code Status on facility chart reviewed during encounter.  9.  Review labs, xrays, diagnostics in realtime as office receives results.    Transcribed from ambient dictation for IVAN Joshua by  Naty Leonardo.  04/26/23   14:23 CDT    I have personally performed the services described in this document as transcribed by the above individual, and it is both accurate and complete.    Electronically signed by IVAN Godfrey 04/26/23, 2:23 PM CDT.

## 2023-05-10 ENCOUNTER — NURSING HOME (OUTPATIENT)
Dept: FAMILY MEDICINE CLINIC | Facility: CLINIC | Age: 88
End: 2023-05-10
Payer: MEDICARE

## 2023-05-10 DIAGNOSIS — M25.512 CHRONIC PAIN OF BOTH SHOULDERS: ICD-10-CM

## 2023-05-10 DIAGNOSIS — I10 ESSENTIAL HYPERTENSION: ICD-10-CM

## 2023-05-10 DIAGNOSIS — M25.511 CHRONIC PAIN OF BOTH SHOULDERS: ICD-10-CM

## 2023-05-10 DIAGNOSIS — R53.81 DEBILITY: ICD-10-CM

## 2023-05-10 DIAGNOSIS — G89.29 CHRONIC PAIN OF BOTH SHOULDERS: ICD-10-CM

## 2023-05-12 NOTE — PROGRESS NOTES
Progress Note      Bin Osorio, APRN  [address] [address]  Phone: [phone]  Fax: [phone]      PATIENT NAME: Devora Grant                                                                          YOB: 1934            DATE OF SERVICE: 05/10/2023  FACILITY: [facility] [place]     CHIEF COMPLAINT:  Regular nursing facility visit      HISTORY OF PRESENT ILLNESS:   This 88 y.o. female was seen at the nursing facility today for routine rounding review of acute and chronic problems.      The patient was seen for a regular visit. She reports shoulder pains are feeling much better. She denies any new issues today.    PAST MEDICAL & SURGICAL HISTORY:   Past Medical History:   Diagnosis Date   • Atrial fibrillation    • Cancer    • Cholelithiasis    • Colon polyp    • Hyperlipidemia    • Hypertension       Past Surgical History:   Procedure Laterality Date   • APPENDECTOMY     • BREAST SURGERY     • COLONOSCOPY  10/2015    Polyps, fair prep, recall 1 year   • COLONOSCOPY      10 yr ago, Dr. Valiente   • HYSTERECTOMY     • MASTECTOMY      Left        MEDICATIONS:  Full medication list from facility reviewed this visit.    ALLERGIES:  Allergies   Allergen Reactions   • Penicillins Hives   • Sulfa Antibiotics Hives   • Sulfasalazine Unknown (See Comments)       SOCIAL HISTORY:  Social History     Socioeconomic History   • Marital status:    Tobacco Use   • Smoking status: Never   • Smokeless tobacco: Never   Substance and Sexual Activity   • Alcohol use: No   • Drug use: No   • Sexual activity: Never     FAMILY HISTORY:  Family History   Problem Relation Age of Onset   • No Known Problems Mother    • No Known Problems Father      REVIEW OF SYSTEMS:  Review of Systems   Constitutional: Negative for appetite change, chills and fever.   Respiratory: Negative for cough and shortness of breath.    Cardiovascular: Negative for chest pain and palpitations.    Musculoskeletal: Negative for arthralgias and myalgias.     PHYSICAL EXAMINATION:   VITAL SIGNS:   Temperature 97.8 degrees Fahrenheit  Pulse 78 bpm  Respiratory rate 18  Blood pressure 126/74 mmHg  Physical Exam  Vitals reviewed.   Constitutional:       General: She is not in acute distress.     Appearance: Normal appearance.   Cardiovascular:      Rate and Rhythm: Normal rate and regular rhythm.   Pulmonary:      Effort: Pulmonary effort is normal.      Breath sounds: Normal breath sounds.       RECORDS REVIEW:   I have reviewed and interpreted any labs, xrays, and diagnostic tests available today.    ASSESSMENT   Diagnoses and all orders for this visit:    1. Nursing home resident (Primary)    2. Debility    3. Chronic pain of both shoulders    4. Essential hypertension    PLAN     1.  Discussed Patient in detail with nursing/staff, addressed all needs today.  2.  Plan of Care Reviewed   3.  PT/OT/SLP Reviewed  4.  Order Changes  - No new orders.  5.  Discharge Plans Reviewed - No immediate plan to discharge.  6.  Advance Directive on file with nursing facility.   7.  POA on file with nursing facility.   8.  Code Status on facility chart reviewed during encounter.  9.  Review labs, xrays, diagnostics in realtime as office receives results.    Transcribed from ambient dictation for IVAN Joshua by Naty Leonardo.  05/12/23   15:47 CDT    I have personally performed the services described in this document as transcribed by the above individual, and it is both accurate and complete.    Electronically signed by IVAN Godfrey 05/12/23, 3:47 PM CDT.

## 2023-08-09 ENCOUNTER — NURSING HOME (OUTPATIENT)
Dept: FAMILY MEDICINE CLINIC | Facility: CLINIC | Age: 88
End: 2023-08-09
Payer: MEDICARE

## 2023-08-09 DIAGNOSIS — I10 ESSENTIAL HYPERTENSION: ICD-10-CM

## 2023-08-09 DIAGNOSIS — R53.81 DEBILITY: ICD-10-CM

## 2023-08-09 NOTE — PROGRESS NOTES
Progress Note      Bin Osorio, APRN  [address] [address]  Phone: [phone]  Fax: [phone]      PATIENT NAME: Devora Grant                                                                          YOB: 1934            DATE OF SERVICE: 08/09/2023  FACILITY: [facility] [place]     CHIEF COMPLAINT:  Regular nursing facility visit      HISTORY OF PRESENT ILLNESS:   This 89 y.o. female was seen at the nursing facility today for routine rounding review of acute and chronic problems.      The patient reports she is doing really well. She reports her ankle swelling has been staying down. She reports her mobility has been fair.    PAST MEDICAL & SURGICAL HISTORY:   Past Medical History:   Diagnosis Date    Atrial fibrillation     Cancer     Cholelithiasis     Colon polyp     Hyperlipidemia     Hypertension       Past Surgical History:   Procedure Laterality Date    APPENDECTOMY      BREAST SURGERY      COLONOSCOPY  10/2015    Polyps, fair prep, recall 1 year    COLONOSCOPY      10 yr ago, Dr. Valiente    HYSTERECTOMY      MASTECTOMY      Left        MEDICATIONS:  Full medication list from facility reviewed this visit.    ALLERGIES:  Allergies   Allergen Reactions    Penicillins Hives    Sulfa Antibiotics Hives    Sulfasalazine Unknown (See Comments)       SOCIAL HISTORY:  Social History     Socioeconomic History    Marital status:    Tobacco Use    Smoking status: Never    Smokeless tobacco: Never   Substance and Sexual Activity    Alcohol use: No    Drug use: No    Sexual activity: Never     FAMILY HISTORY:  Family History   Problem Relation Age of Onset    No Known Problems Mother     No Known Problems Father      REVIEW OF SYSTEMS:  Review of Systems   Constitutional:  Negative for appetite change, chills and fever.   Respiratory:  Negative for cough and shortness of breath.    Cardiovascular:  Negative for chest pain and palpitations.   Musculoskeletal:   Negative for arthralgias and myalgias.   PHYSICAL EXAMINATION:   VITAL SIGNS: Temperature 97.2, pulse 80 bpm, respiratory rate 16 bpm, blood pressure 128/76 mmHg.  Physical Exam  Vitals reviewed.   Constitutional:       General: She is not in acute distress.     Appearance: Normal appearance.   Cardiovascular:      Rate and Rhythm: Normal rate and regular rhythm.   Pulmonary:      Effort: Pulmonary effort is normal.      Breath sounds: Normal breath sounds.     RECORDS REVIEW:   I have reviewed and interpreted any labs, xrays, and diagnostic tests available today.    ASSESSMENT   Diagnoses and all orders for this visit:    1. Nursing home resident (Primary)    2. Essential hypertension    3. Debility    PLAN    1.  Discussed Patient in detail with nursing/staff, addressed all needs today.  2.  Plan of Care Reviewed   3.  PT/OT/SLP Reviewed  4.  Order Changes  - No new orders  5.  Discharge Plans Reviewed - No immediate plan to discharge.  6.  Advance Directive on file with nursing facility.   7.  POA on file with nursing facility.   8.  Code Status on facility chart reviewed during encounter.  9.  Review labs, xrays, diagnostics in realtime as office receives results.    Transcribed from ambient dictation for IVAN Joshua by Darien Wahl.  08/09/23   16:45 CDT    I have personally performed the services described in this document as transcribed by the above individual, and it is both accurate and complete.    Electronically signed by Bin Osorio, 08/09/23, 4:45 PM CDT.

## 2023-09-27 ENCOUNTER — OFFICE VISIT (OUTPATIENT)
Dept: FAMILY MEDICINE CLINIC | Facility: CLINIC | Age: 88
End: 2023-09-27
Payer: MEDICARE

## 2023-09-27 VITALS
DIASTOLIC BLOOD PRESSURE: 78 MMHG | HEART RATE: 80 BPM | OXYGEN SATURATION: 99 % | TEMPERATURE: 97.2 F | RESPIRATION RATE: 18 BRPM | HEIGHT: 67 IN | BODY MASS INDEX: 29.66 KG/M2 | SYSTOLIC BLOOD PRESSURE: 126 MMHG | WEIGHT: 189 LBS

## 2023-09-27 DIAGNOSIS — Z00.00 MEDICARE ANNUAL WELLNESS VISIT, SUBSEQUENT: ICD-10-CM

## 2023-09-27 NOTE — PROGRESS NOTES
The ABCs of the Annual Wellness Visit  Subsequent Medicare Wellness Visit    Subjective      Devora Grant is a 89 y.o. female who presents for a Subsequent Medicare Wellness Visit.    The following portions of the patient's history were reviewed and   updated as appropriate: allergies, current medications, past family history, past medical history, past social history, past surgical history, and problem list.    Compared to one year ago, the patient feels her physical   health is the same.    Compared to one year ago, the patient feels her mental   health is better.    Recent Hospitalizations:  She was not admitted to the hospital during the last year.       Current Medical Providers:  Patient Care Team:  Sandoval Christian MD as PCP - General (Family Medicine)  Kong Tse MD as Cardiologist (Cardiology)    Outpatient Medications Prior to Visit   Medication Sig Dispense Refill    Calcium Citrate-Vitamin D (CALCIUM + D PO) Take 500 mg by mouth daily.      carvedilol (COREG) 25 MG tablet TAKE 1 TABLET BY MOUTH TWICE DAILY WITH MEALS 180 tablet 1    dilTIAZem CD (CARDIZEM CD) 180 MG 24 hr capsule Take 1 capsule by mouth Daily. 90 capsule 1    Eliquis 5 MG tablet tablet TAKE 1 TABLET BY MOUTH EVERY 12 HOURS 180 tablet 1    hydrALAZINE (APRESOLINE) 25 MG tablet TAKE 1 TABLET BY MOUTH THREE TIMES DAILY 270 tablet 1    LOTEMAX 0.5 % ophthalmic suspension Apply 1 drop to eye(s) as directed by provider 4 (Four) Times a Day. 3 each 1    lovastatin (MEVACOR) 10 MG tablet Take 1 tablet by mouth Daily. 90 tablet 0    methylPREDNISolone (MEDROL) 4 MG dose pack Take  by mouth 2 (Two) Times a Day. Take as directed on package instructions.      spironolactone (ALDACTONE) 50 MG tablet TAKE 1 TABLET BY MOUTH DAILY 90 tablet 2    traMADol (ULTRAM) 50 MG tablet Take 1 tablet by mouth Every 6 (Six) Hours As Needed for Moderate Pain. 40 tablet 0    valsartan (Diovan) 320 MG tablet Take 1 tablet by mouth Daily. 30 tablet  "5     No facility-administered medications prior to visit.       Opioid medication/s are on active medication list.  and I have evaluated her active treatment plan and pain score trends (see table).  There were no vitals filed for this visit.  I have reviewed the chart for potential of high risk medication and harmful drug interactions in the elderly.          Aspirin is not on active medication list.  Aspirin use is contraindicated for this patient due to: current use of Eliquis.  .    Patient Active Problem List   Diagnosis    Essential hypertension    Hyperlipidemia    Persistent atrial fibrillation    Malignant neoplasm of upper-outer quadrant of left female breast    Bilateral shoulder pain    Hypokalemia    Seasonal allergic rhinitis due to pollen    Orthostasis    Weight loss    Closed head injury    Hyponatremia     Advance Care Planning   Advance Care Planning     Advance Directive is on file.  ACP discussion was declined by the patient. Patient has an advance directive in EMR which is still valid.      Objective    Vitals:    09/27/23 1528   BP: 126/78   Pulse: 80   Resp: 18   Temp: 97.2 °F (36.2 °C)   TempSrc: Infrared   SpO2: 99%   Weight: 85.7 kg (189 lb)   Height: 170.2 cm (67\")     Estimated body mass index is 29.6 kg/m² as calculated from the following:    Height as of this encounter: 170.2 cm (67\").    Weight as of this encounter: 85.7 kg (189 lb).    BMI is >= 25 and <30. (Overweight) The following options were offered after discussion;: none (medical contraindication)      Does the patient have evidence of cognitive impairment?   No            HEALTH RISK ASSESSMENT    Smoking Status:  Social History     Tobacco Use   Smoking Status Never   Smokeless Tobacco Never     Alcohol Consumption:  Social History     Substance and Sexual Activity   Alcohol Use No     Fall Risk Screen:    STEADI Fall Risk Assessment was completed, and patient is at HIGH risk for falls. Assessment completed " on:2023    Depression Screenin/27/2023     3:29 PM   PHQ-2/PHQ-9 Depression Screening   Little Interest or Pleasure in Doing Things 0-->not at all   Feeling Down, Depressed or Hopeless 0-->not at all   PHQ-9: Brief Depression Severity Measure Score 0       Health Habits and Functional and Cognitive Screenin/27/2023     3:00 PM   Functional & Cognitive Status   Do you have difficulty preparing food and eating? Yes   Do you have difficulty bathing yourself, getting dressed or grooming yourself? Yes   Do you have difficulty using the toilet? Yes   Do you have difficulty moving around from place to place? Yes   Do you have trouble with steps or getting out of a bed or a chair? Yes   Current Diet Well Balanced Diet   Dental Exam Up to date   Eye Exam Up to date   Exercise (times per week) 3 times per week   Current Exercises Include Other        Exercise Comment Restortative   Do you need help using the phone?  No   Are you deaf or do you have serious difficulty hearing?  No   Do you need help to go to places out of walking distance? Yes   Do you need help shopping? Yes   Do you need help preparing meals?  Yes   Do you need help with housework?  Yes   Do you need help with laundry? Yes   Do you need help taking your medications? Yes   Do you need help managing money? Yes   Do you ever drive or ride in a car without wearing a seat belt? No   Have you felt unusual stress, anger or loneliness in the last month? No   Who do you live with? Other   If you need help, do you have trouble finding someone available to you? No   Have you been bothered in the last four weeks by sexual problems? No   Do you have difficulty concentrating, remembering or making decisions? Yes       Age-appropriate Screening Schedule:  Refer to the list below for future screening recommendations based on patient's age, sex and/or medical conditions. Orders for these recommended tests are listed in the plan section. The patient has  been provided with a written plan.    Health Maintenance   Topic Date Due    MAMMOGRAM  Never done    DXA SCAN  Never done    TDAP/TD VACCINES (1 - Tdap) Never done    ZOSTER VACCINE (1 of 2) Never done    Pneumococcal Vaccine 65+ (1 - PCV) Never done    ANNUAL WELLNESS VISIT  09/16/2021    COVID-19 Vaccine (4 - Moderna series) 02/08/2022    INFLUENZA VACCINE  10/01/2023    LIPID PANEL  01/21/2024                  CMS Preventative Services Quick Reference  Risk Factors Identified During Encounter:    Fall Risk-High or Moderate:  LTC to take safety precautions  Immunizations Discussed/Encouraged: Influenza and COVID19  Dental Screening Recommended  Vision Screening Recommended    The above risks/problems have been discussed with the patient.  Pertinent information has been shared with the patient in the After Visit Summary.    Diagnoses and all orders for this visit:    1. Nursing home resident (Primary)    2. Medicare annual wellness visit, subsequent    Patient is a long-term resident.  They do have in-house options for dental and vision but the patient also has the freedom to go to her own providers.  I recommend yearly visits.    Follow Up:   Next Medicare Wellness visit to be scheduled in 1 year.      An After Visit Summary and PPPS were made available to the patient.

## 2023-10-11 ENCOUNTER — NURSING HOME (OUTPATIENT)
Dept: FAMILY MEDICINE CLINIC | Facility: CLINIC | Age: 88
End: 2023-10-11
Payer: MEDICARE

## 2023-10-11 DIAGNOSIS — R53.81 DEBILITY: ICD-10-CM

## 2023-10-11 DIAGNOSIS — G89.29 CHRONIC PAIN OF BOTH SHOULDERS: ICD-10-CM

## 2023-10-11 DIAGNOSIS — I10 ESSENTIAL HYPERTENSION: ICD-10-CM

## 2023-10-11 DIAGNOSIS — M25.511 CHRONIC PAIN OF BOTH SHOULDERS: ICD-10-CM

## 2023-10-11 DIAGNOSIS — M25.512 CHRONIC PAIN OF BOTH SHOULDERS: ICD-10-CM

## 2023-10-11 NOTE — PROGRESS NOTES
"Progress Note      Bin Osorio, APRN  [address] [address]  Phone: [phone]  Fax: [phone]      PATIENT NAME: Devora Grant                                                                          YOB: 1934            DATE OF SERVICE: 10/11/2023  FACILITY: [facility] [place]     CHIEF COMPLAINT:  Regular nursing facility visit      HISTORY OF PRESENT ILLNESS:   This 89 y.o. female was seen at the nursing facility today for routine rounding review of acute and chronic problems.      The patient reports feeling well today. She denies any new issues. She reports her shoulder pain \"waxes and wanes\", but does much better when they give her the Voltaren gel. I did speak with the assistant director of nursing about ensuring the nursing staff does not miss any Voltaren doses.  Blood pressure trends were reviewed.     PAST MEDICAL & SURGICAL HISTORY:   Past Medical History:   Diagnosis Date    Atrial fibrillation     Cancer     Cholelithiasis     Colon polyp     Hyperlipidemia     Hypertension       Past Surgical History:   Procedure Laterality Date    APPENDECTOMY      BREAST SURGERY      COLONOSCOPY  10/2015    Polyps, fair prep, recall 1 year    COLONOSCOPY      10 yr ago, Dr. Valiente    HYSTERECTOMY      MASTECTOMY      Left        MEDICATIONS:  Full medication list from facility reviewed this visit.    ALLERGIES:  Allergies   Allergen Reactions    Penicillins Hives    Sulfa Antibiotics Hives    Sulfasalazine Unknown (See Comments)       SOCIAL HISTORY:  Social History     Socioeconomic History    Marital status:    Tobacco Use    Smoking status: Never    Smokeless tobacco: Never   Substance and Sexual Activity    Alcohol use: No    Drug use: No    Sexual activity: Never     FAMILY HISTORY:  Family History   Problem Relation Age of Onset    No Known Problems Mother     No Known Problems Father      REVIEW OF SYSTEMS:  Review of Systems   Constitutional:  " Negative for appetite change, chills and fever.   Respiratory:  Negative for cough and shortness of breath.    Cardiovascular:  Negative for chest pain and palpitations.   Musculoskeletal:  Negative for arthralgias and myalgias.        Positive for bilateral shoulder pain     PHYSICAL EXAMINATION:   VITAL SIGNS: Temperature 97.5 degrees   Fahrenheit, pulse 80 bpm  Respiratory rate 18  Blood pressure 128/68 mmHg.  Physical Exam  Vitals reviewed.   Constitutional:       General: She is not in acute distress.     Appearance: Normal appearance.   Cardiovascular:      Rate and Rhythm: Normal rate and regular rhythm.   Pulmonary:      Effort: Pulmonary effort is normal.      Breath sounds: Normal breath sounds.   Musculoskeletal:      Comments: Palpable tenderness bilateral shoulders       RECORDS REVIEW:   I have reviewed and interpreted any labs, xrays, and diagnostic tests available today.    ASSESSMENT   Diagnoses and all orders for this visit:    1. Nursing home resident (Primary)    2. Debility    3. Essential hypertension    4. Chronic pain of both shoulders    PLAN  1.  Discussed Patient in detail with nursing/staff, addressed all needs today.     2.  Plan of Care Reviewed   3.  PT/OT/SLP Reviewed  4.  Order Changes  - No new orders  5.  Discharge Plans Reviewed - No immediate plan to discharge.  6.  Advance Directive on file with nursing facility.   7.  POA on file with nursing facility.   8.  Code Status on facility chart reviewed during encounter.  9.  Review labs, xrays, diagnostics in realtime as office receives results.    Note e-Signed by Bin Osorio on 10/11/2023 at 15:58 CDT    Transcribed from ambient dictation for IVAN Joshua by Maximino Gurrola.  10/11/23   15:59 CDT    I have personally performed the services described in this document as transcribed by the above individual, and it is both accurate and complete.

## 2023-11-26 DIAGNOSIS — M25.50 ARTHRALGIA, UNSPECIFIED JOINT: ICD-10-CM

## 2023-11-27 DIAGNOSIS — M25.50 ARTHRALGIA, UNSPECIFIED JOINT: ICD-10-CM

## 2023-11-27 RX ORDER — TRAMADOL HYDROCHLORIDE 50 MG/1
50 TABLET ORAL EVERY 6 HOURS
Qty: 40 TABLET | Refills: 0 | Status: SHIPPED | OUTPATIENT
Start: 2023-11-27

## 2023-11-27 RX ORDER — TRAMADOL HYDROCHLORIDE 50 MG/1
50 TABLET ORAL EVERY 6 HOURS PRN
Qty: 10 TABLET | OUTPATIENT
Start: 2023-11-27

## 2023-12-13 ENCOUNTER — NURSING HOME (OUTPATIENT)
Dept: FAMILY MEDICINE CLINIC | Facility: CLINIC | Age: 88
End: 2023-12-13
Payer: MEDICARE

## 2023-12-13 DIAGNOSIS — I10 ESSENTIAL HYPERTENSION: ICD-10-CM

## 2023-12-13 DIAGNOSIS — R53.81 DEBILITY: ICD-10-CM

## 2023-12-13 DIAGNOSIS — M25.511 CHRONIC PAIN OF BOTH SHOULDERS: ICD-10-CM

## 2023-12-13 DIAGNOSIS — M25.512 CHRONIC PAIN OF BOTH SHOULDERS: ICD-10-CM

## 2023-12-13 DIAGNOSIS — G89.29 CHRONIC PAIN OF BOTH SHOULDERS: ICD-10-CM

## 2023-12-13 NOTE — PROGRESS NOTES
Progress Note      Bin Osorio, APRN  [address] [address]  Phone: [phone]  Fax: [phone]      PATIENT NAME: Devora Grant                                                                          YOB: 1934            DATE OF SERVICE: 12/13/2023  FACILITY: [facility] [place]     CHIEF COMPLAINT:  Regular nursing facility visit      HISTORY OF PRESENT ILLNESS:   This 89 y.o. female was seen at the nursing facility today for routine rounding review of acute and chronic problems.      The patient reports her only concern is difficulty staying asleep at night. She reports no difficulty getting to sleep, but reports only sleeps about 4 hours at night and then has to nap several times during the day.    PAST MEDICAL & SURGICAL HISTORY:   Past Medical History:   Diagnosis Date    Atrial fibrillation     Cancer     Cholelithiasis     Colon polyp     Hyperlipidemia     Hypertension       Past Surgical History:   Procedure Laterality Date    APPENDECTOMY      BREAST SURGERY      COLONOSCOPY  10/2015    Polyps, fair prep, recall 1 year    COLONOSCOPY      10 yr ago, Dr. Valiente    HYSTERECTOMY      MASTECTOMY      Left        MEDICATIONS:  Full medication list from facility reviewed this visit.    ALLERGIES:  Allergies   Allergen Reactions    Penicillins Hives    Sulfa Antibiotics Hives    Sulfasalazine Unknown (See Comments)       SOCIAL HISTORY:  Social History     Socioeconomic History    Marital status:    Tobacco Use    Smoking status: Never    Smokeless tobacco: Never   Substance and Sexual Activity    Alcohol use: No    Drug use: No    Sexual activity: Never     FAMILY HISTORY:  Family History   Problem Relation Age of Onset    No Known Problems Mother     No Known Problems Father      REVIEW OF SYSTEMS:  Review of Systems   Constitutional:  Negative for appetite change, chills and fever.   Respiratory:  Negative for cough and shortness of breath.     Cardiovascular:  Negative for chest pain and palpitations.   Musculoskeletal:  Positive for gait problem. Negative for arthralgias and myalgias.   Psychiatric/Behavioral:  Positive for sleep disturbance. Negative for depressed mood. The patient is not nervous/anxious.      PHYSICAL EXAMINATION:   VITAL SIGNS: Temperature 97.8 degrees Fahrenheit. Pulse 80 bpm. Respiratory rate 16. Blood pressure 128/74 mmHg.  Physical Exam  Vitals reviewed.   Constitutional:       General: She is not in acute distress.     Appearance: Normal appearance.   Neck:      Vascular: No carotid bruit.   Cardiovascular:      Rate and Rhythm: Normal rate and regular rhythm.      Pulses:           Dorsalis pedis pulses are 2+ on the right side and 2+ on the left side.        Posterior tibial pulses are 2+ on the right side and 2+ on the left side.   Pulmonary:      Effort: Pulmonary effort is normal.      Breath sounds: Normal breath sounds.   Musculoskeletal:      Right lower leg: No edema.      Left lower leg: No edema.   Psychiatric:      Comments: Openly voices feelings.       RECORDS REVIEW:   I have reviewed and interpreted any labs, xrays, and diagnostic tests available today.    ASSESSMENT   Diagnoses and all orders for this visit:    1. Nursing home resident (Primary)    2. Debility    3. Essential hypertension    4. Chronic pain of both shoulders  Comments:  -Reported as markedly improved.    PLAN    1.  Discussed Patient in detail with nursing/staff, addressed all needs today.  2.  Plan of Care Reviewed   3.  PT/OT/SLP Reviewed  4.  Order Changes  - Melatonin 1 mg by mouth bedtime, 7-day trial first.  5.  Discharge Plans Reviewed - No immediate plan to discharge.  6.  Advance Directive on file with nursing facility.   7.  POA on file with nursing facility.   8.  Code Status on facility chart reviewed during encounter.  9.  Review labs, xrays, diagnostics in realtime as office receives results.    Transcribed from ambient dictation  for IVAN Joshua by Darien Wahl.  12/13/23   17:23 CST    I have personally performed the services described in this document as transcribed by the above individual, and it is both accurate and complete.    Electronically signed by IVAN Joshua, 12/13/23, 5:23 PM CST.

## 2023-12-22 DIAGNOSIS — M25.50 ARTHRALGIA, UNSPECIFIED JOINT: ICD-10-CM

## 2023-12-22 RX ORDER — TRAMADOL HYDROCHLORIDE 50 MG/1
50 TABLET ORAL EVERY 6 HOURS
Qty: 40 TABLET | Refills: 0 | Status: SHIPPED | OUTPATIENT
Start: 2023-12-22

## 2024-03-04 ENCOUNTER — TELEPHONE (OUTPATIENT)
Dept: FAMILY MEDICINE CLINIC | Facility: CLINIC | Age: 89
End: 2024-03-04
Payer: MEDICARE

## 2024-03-04 NOTE — TELEPHONE ENCOUNTER
[facility] Called because pt is afebrile, productive cough, wheezing. Wants to know if dr wants a cxr or to call something in.

## 2024-03-06 ENCOUNTER — NURSING HOME (OUTPATIENT)
Dept: FAMILY MEDICINE CLINIC | Facility: CLINIC | Age: 89
End: 2024-03-06
Payer: MEDICARE

## 2024-03-07 NOTE — PROGRESS NOTES
Attempted to see patient for routine nursing home rounds. she is out of the facility and admitted to the hospital currently.    Electronically signed by IVAN Joshua, 03/07/24, 12:20 PM CST.

## 2024-03-27 DIAGNOSIS — M25.50 ARTHRALGIA, UNSPECIFIED JOINT: ICD-10-CM

## 2024-03-27 RX ORDER — TRAMADOL HYDROCHLORIDE 50 MG/1
50 TABLET ORAL EVERY 6 HOURS
Qty: 40 TABLET | Refills: 0 | Status: SHIPPED | OUTPATIENT
Start: 2024-03-27

## 2024-03-28 ENCOUNTER — TELEPHONE (OUTPATIENT)
Dept: FAMILY MEDICINE CLINIC | Facility: CLINIC | Age: 89
End: 2024-03-28

## 2024-03-28 NOTE — TELEPHONE ENCOUNTER
I have not changed the order and it looks like Dr. Christian send in #40 of Tramadol 50 mg yesterday.    Electronically signed by IVAN Joshua, 03/28/24, 12:50 PM CDT.

## 2024-03-28 NOTE — TELEPHONE ENCOUNTER
Caller: [facility]     Relationship: [facility]     Best call back number: [phone]    What is the best time to reach you: ANYTIME     Who are you requesting to speak with (clinical staff, provider,  specific staff member): CLINICAL   Do you know the name of the person who called:  CLINICAL     What was the call regarding: SHE STATES THEY HAVE THE TRAMADOL 50 SHE STATES THE PHARMACY IS STATING 37.5 325 MG     SHE STATES CAN SHE DC AND HAVE THE ORDER FOR THE 50 MG OF TRAMADOL IF SHE IS UNABLE TO GIVE HER THE 50 MG SHE NEEDS A NEW ORDER FOR THE 37.5     Is it okay if the provider responds through [email or web address]: CALL BACK       IF IT NEEDS TO BE THE 37.5 OF TRAMADOL PLEASE SEND THE NEW ORDER TO   [facility] [place]([facility]) - [place], [place] - [address] [place] [address] [phone] [facility] [phone]  [phone]

## 2024-03-29 NOTE — TELEPHONE ENCOUNTER
[facility] notified tramadol script has not been changed